# Patient Record
Sex: MALE | Race: WHITE | Employment: OTHER | URBAN - METROPOLITAN AREA
[De-identification: names, ages, dates, MRNs, and addresses within clinical notes are randomized per-mention and may not be internally consistent; named-entity substitution may affect disease eponyms.]

---

## 2017-01-01 ENCOUNTER — HOSPITAL ENCOUNTER (EMERGENCY)
Facility: HOSPITAL | Age: 82
Discharge: HOME | End: 2017-01-01
Attending: EMERGENCY MEDICINE
Payer: MEDICARE

## 2017-01-01 ENCOUNTER — APPOINTMENT (OUTPATIENT)
Dept: ULTRASOUND IMAGING | Facility: HOSPITAL | Age: 82
DRG: 062 | End: 2017-01-01
Attending: Other
Payer: MEDICARE

## 2017-01-01 ENCOUNTER — APPOINTMENT (OUTPATIENT)
Dept: CT IMAGING | Facility: HOSPITAL | Age: 82
DRG: 062 | End: 2017-01-01
Attending: Other
Payer: MEDICARE

## 2017-01-01 ENCOUNTER — APPOINTMENT (OUTPATIENT)
Dept: GENERAL RADIOLOGY | Facility: HOSPITAL | Age: 82
DRG: 062 | End: 2017-01-01
Attending: INTERNAL MEDICINE
Payer: MEDICARE

## 2017-01-01 ENCOUNTER — APPOINTMENT (OUTPATIENT)
Dept: GENERAL RADIOLOGY | Facility: HOSPITAL | Age: 82
DRG: 280 | End: 2017-01-01
Attending: EMERGENCY MEDICINE
Payer: MEDICARE

## 2017-01-01 ENCOUNTER — APPOINTMENT (OUTPATIENT)
Dept: CT IMAGING | Facility: HOSPITAL | Age: 82
DRG: 062 | End: 2017-01-01
Payer: MEDICARE

## 2017-01-01 ENCOUNTER — HOSPITAL ENCOUNTER (INPATIENT)
Facility: HOSPITAL | Age: 82
LOS: 4 days | Discharge: NURSING, REHAB OR LONG-TERM CARE | DRG: 062 | End: 2017-01-01
Attending: EMERGENCY MEDICINE | Admitting: INTERNAL MEDICINE
Payer: MEDICARE

## 2017-01-01 ENCOUNTER — APPOINTMENT (OUTPATIENT)
Dept: ULTRASOUND IMAGING | Facility: HOSPITAL | Age: 82
DRG: 280 | End: 2017-01-01
Attending: HOSPITALIST
Payer: MEDICARE

## 2017-01-01 ENCOUNTER — APPOINTMENT (OUTPATIENT)
Dept: CV DIAGNOSTICS | Facility: HOSPITAL | Age: 82
DRG: 280 | End: 2017-01-01
Attending: HOSPITALIST
Payer: MEDICARE

## 2017-01-01 ENCOUNTER — APPOINTMENT (OUTPATIENT)
Dept: CV DIAGNOSTICS | Facility: HOSPITAL | Age: 82
DRG: 062 | End: 2017-01-01
Attending: INTERNAL MEDICINE
Payer: MEDICARE

## 2017-01-01 ENCOUNTER — HOSPITAL ENCOUNTER (INPATIENT)
Facility: HOSPITAL | Age: 82
LOS: 9 days | Discharge: HOSPICE | DRG: 280 | End: 2018-01-01
Attending: EMERGENCY MEDICINE | Admitting: HOSPITALIST
Payer: MEDICARE

## 2017-01-01 ENCOUNTER — APPOINTMENT (OUTPATIENT)
Dept: CT IMAGING | Facility: HOSPITAL | Age: 82
DRG: 062 | End: 2017-01-01
Attending: EMERGENCY MEDICINE
Payer: MEDICARE

## 2017-01-01 VITALS
TEMPERATURE: 98 F | HEART RATE: 66 BPM | BODY MASS INDEX: 21.37 KG/M2 | WEIGHT: 141 LBS | DIASTOLIC BLOOD PRESSURE: 63 MMHG | OXYGEN SATURATION: 96 % | HEIGHT: 68 IN | RESPIRATION RATE: 18 BRPM | SYSTOLIC BLOOD PRESSURE: 124 MMHG

## 2017-01-01 VITALS
RESPIRATION RATE: 20 BRPM | TEMPERATURE: 98 F | WEIGHT: 145 LBS | BODY MASS INDEX: 22.76 KG/M2 | SYSTOLIC BLOOD PRESSURE: 156 MMHG | DIASTOLIC BLOOD PRESSURE: 86 MMHG | OXYGEN SATURATION: 95 % | HEART RATE: 80 BPM | HEIGHT: 67 IN

## 2017-01-01 VITALS
SYSTOLIC BLOOD PRESSURE: 142 MMHG | DIASTOLIC BLOOD PRESSURE: 83 MMHG | OXYGEN SATURATION: 95 % | RESPIRATION RATE: 18 BRPM | HEIGHT: 68 IN | TEMPERATURE: 98 F | WEIGHT: 128 LBS | BODY MASS INDEX: 19.4 KG/M2 | HEART RATE: 85 BPM

## 2017-01-01 DIAGNOSIS — B02.33 HERPES ZOSTER KERATITIS: Primary | ICD-10-CM

## 2017-01-01 DIAGNOSIS — H61.21 IMPACTED CERUMEN OF RIGHT EAR: ICD-10-CM

## 2017-01-01 DIAGNOSIS — R09.02 HYPOXIA: Primary | ICD-10-CM

## 2017-01-01 DIAGNOSIS — I50.9 CONGESTIVE HEART FAILURE, UNSPECIFIED CONGESTIVE HEART FAILURE CHRONICITY, UNSPECIFIED CONGESTIVE HEART FAILURE TYPE: ICD-10-CM

## 2017-01-01 DIAGNOSIS — J18.9 COMMUNITY ACQUIRED PNEUMONIA, UNSPECIFIED LATERALITY: ICD-10-CM

## 2017-01-01 DIAGNOSIS — B02.9 HERPES ZOSTER WITHOUT COMPLICATION: Primary | ICD-10-CM

## 2017-01-01 DIAGNOSIS — I63.9 ACUTE CVA (CEREBROVASCULAR ACCIDENT) (HCC): Primary | ICD-10-CM

## 2017-01-01 LAB
ADENOVIRUS PCR:: NEGATIVE
ANION GAP SERPL CALC-SCNC: 11 MMOL/L (ref 0–18)
ANION GAP SERPL CALC-SCNC: 12 MMOL/L (ref 0–18)
ANION GAP SERPL CALC-SCNC: 17 MMOL/L (ref 0–18)
APTT PPP: 32 SECONDS (ref 23.2–35.3)
APTT PPP: 43.6 SECONDS (ref 23.2–35.3)
APTT PPP: 44.2 SECONDS (ref 23.2–35.3)
B PERT DNA SPEC QL NAA+PROBE: NEGATIVE
BASOPHILS # BLD: 0 K/UL (ref 0–0.2)
BASOPHILS # BLD: 0.1 K/UL (ref 0–0.2)
BASOPHILS NFR BLD: 0 %
BASOPHILS NFR BLD: 0 %
BNP SERPL-MCNC: 342 PG/ML (ref 0–100)
BUN SERPL-MCNC: 21 MG/DL (ref 8–20)
BUN SERPL-MCNC: 23 MG/DL (ref 8–20)
BUN SERPL-MCNC: 24 MG/DL (ref 8–20)
BUN/CREAT SERPL: 19.1 (ref 10–20)
BUN/CREAT SERPL: 21.7 (ref 10–20)
BUN/CREAT SERPL: 23.5 (ref 10–20)
C PNEUM DNA SPEC QL NAA+PROBE: NEGATIVE
CALCIUM SERPL-MCNC: 9.3 MG/DL (ref 8.5–10.5)
CALCIUM SERPL-MCNC: 9.5 MG/DL (ref 8.5–10.5)
CALCIUM SERPL-MCNC: 9.6 MG/DL (ref 8.5–10.5)
CHLORIDE SERPL-SCNC: 97 MMOL/L (ref 95–110)
CHLORIDE SERPL-SCNC: 99 MMOL/L (ref 95–110)
CHLORIDE SERPL-SCNC: 99 MMOL/L (ref 95–110)
CHOLEST SERPL-MCNC: 106 MG/DL (ref 110–200)
CO2 SERPL-SCNC: 27 MMOL/L (ref 22–32)
CO2 SERPL-SCNC: 27 MMOL/L (ref 22–32)
CO2 SERPL-SCNC: 28 MMOL/L (ref 22–32)
CORONAVIRUS 229E PCR:: NEGATIVE
CORONAVIRUS HKU1 PCR:: NEGATIVE
CORONAVIRUS NL63 PCR:: NEGATIVE
CORONAVIRUS OC43 PCR:: NEGATIVE
CREAT SERPL-MCNC: 1.02 MG/DL (ref 0.5–1.5)
CREAT SERPL-MCNC: 1.06 MG/DL (ref 0.5–1.5)
CREAT SERPL-MCNC: 1.1 MG/DL (ref 0.5–1.5)
EOSINOPHIL # BLD: 0 K/UL (ref 0–0.7)
EOSINOPHIL # BLD: 0.1 K/UL (ref 0–0.7)
EOSINOPHIL NFR BLD: 0 %
EOSINOPHIL NFR BLD: 1 %
ERYTHROCYTE [DISTWIDTH] IN BLOOD BY AUTOMATED COUNT: 14.2 % (ref 11–15)
ERYTHROCYTE [DISTWIDTH] IN BLOOD BY AUTOMATED COUNT: 14.3 % (ref 11–15)
ERYTHROCYTE [DISTWIDTH] IN BLOOD BY AUTOMATED COUNT: 14.3 % (ref 11–15)
FLUAV RNA SPEC QL NAA+PROBE: NEGATIVE
FLUBV RNA SPEC QL NAA+PROBE: NEGATIVE
GLUCOSE SERPL-MCNC: 120 MG/DL (ref 70–99)
GLUCOSE SERPL-MCNC: 135 MG/DL (ref 70–99)
GLUCOSE SERPL-MCNC: 179 MG/DL (ref 70–99)
HCT VFR BLD AUTO: 43.7 % (ref 41–52)
HCT VFR BLD AUTO: 44.7 % (ref 41–52)
HCT VFR BLD AUTO: 45.1 % (ref 41–52)
HDLC SERPL-MCNC: 46 MG/DL
HGB BLD-MCNC: 14.2 G/DL (ref 13.5–17.5)
HGB BLD-MCNC: 14.3 G/DL (ref 13.5–17.5)
HGB BLD-MCNC: 14.6 G/DL (ref 13.5–17.5)
LDLC SERPL CALC-MCNC: 50 MG/DL (ref 0–99)
LYMPHOCYTES # BLD: 1.1 K/UL (ref 1–4)
LYMPHOCYTES # BLD: 1.3 K/UL (ref 1–4)
LYMPHOCYTES NFR BLD: 5 %
LYMPHOCYTES NFR BLD: 7 %
MCH RBC QN AUTO: 30.4 PG (ref 27–32)
MCH RBC QN AUTO: 30.6 PG (ref 27–32)
MCH RBC QN AUTO: 30.7 PG (ref 27–32)
MCHC RBC AUTO-ENTMCNC: 32 G/DL (ref 32–37)
MCHC RBC AUTO-ENTMCNC: 32.3 G/DL (ref 32–37)
MCHC RBC AUTO-ENTMCNC: 32.5 G/DL (ref 32–37)
MCV RBC AUTO: 94.6 FL (ref 80–100)
MCV RBC AUTO: 94.7 FL (ref 80–100)
MCV RBC AUTO: 95 FL (ref 80–100)
METAPNEUMOVIRUS PCR:: NEGATIVE
MONOCYTES # BLD: 1.7 K/UL (ref 0–1)
MONOCYTES # BLD: 2.3 K/UL (ref 0–1)
MONOCYTES NFR BLD: 11 %
MONOCYTES NFR BLD: 9 %
MRSA DNA SPEC QL NAA+PROBE: NEGATIVE
MYCOPLASMA PNEUMONIA PCR:: NEGATIVE
NEUTROPHILS # BLD AUTO: 12.5 K/UL (ref 1.8–7.7)
NEUTROPHILS # BLD AUTO: 22 K/UL (ref 1.8–7.7)
NEUTROPHILS NFR BLD: 81 %
NEUTROPHILS NFR BLD: 81 %
NEUTS BAND NFR BLD: 5 %
NONHDLC SERPL-MCNC: 60 MG/DL
OSMOLALITY UR CALC.SUM OF ELEC: 291 MOSM/KG (ref 275–295)
OSMOLALITY UR CALC.SUM OF ELEC: 293 MOSM/KG (ref 275–295)
OSMOLALITY UR CALC.SUM OF ELEC: 298 MOSM/KG (ref 275–295)
PARAINFLUENZA 1 PCR:: NEGATIVE
PARAINFLUENZA 2 PCR:: NEGATIVE
PARAINFLUENZA 3 PCR:: NEGATIVE
PARAINFLUENZA 4 PCR:: NEGATIVE
PLATELET # BLD AUTO: 407 K/UL (ref 140–400)
PLATELET # BLD AUTO: 428 K/UL (ref 140–400)
PLATELET # BLD AUTO: 430 K/UL (ref 140–400)
PMV BLD AUTO: 8.1 FL (ref 7.4–10.3)
PMV BLD AUTO: 8.3 FL (ref 7.4–10.3)
PMV BLD AUTO: 8.3 FL (ref 7.4–10.3)
POTASSIUM SERPL-SCNC: 4.1 MMOL/L (ref 3.3–5.1)
POTASSIUM SERPL-SCNC: 4.2 MMOL/L (ref 3.3–5.1)
POTASSIUM SERPL-SCNC: 4.4 MMOL/L (ref 3.3–5.1)
PROCALCITONIN SERPL-MCNC: 1.33 NG/ML (ref ?–0.11)
RBC # BLD AUTO: 4.62 M/UL (ref 4.5–5.9)
RBC # BLD AUTO: 4.71 M/UL (ref 4.5–5.9)
RBC # BLD AUTO: 4.77 M/UL (ref 4.5–5.9)
RHINOVIRUS/ENTERO PCR:: NEGATIVE
RSV RNA SPEC QL NAA+PROBE: NEGATIVE
SODIUM SERPL-SCNC: 137 MMOL/L (ref 136–144)
SODIUM SERPL-SCNC: 139 MMOL/L (ref 136–144)
SODIUM SERPL-SCNC: 141 MMOL/L (ref 136–144)
T4 FREE SERPL-MCNC: 0.89 NG/DL (ref 0.58–1.64)
TRIGL SERPL-MCNC: 52 MG/DL (ref 1–149)
TROPONIN I SERPL-MCNC: 0.1 NG/ML (ref ?–0.03)
TROPONIN I SERPL-MCNC: 0.27 NG/ML (ref ?–0.03)
TROPONIN I SERPL-MCNC: 12.02 NG/ML (ref ?–0.03)
TROPONIN I SERPL-MCNC: 13.27 NG/ML (ref ?–0.03)
TROPONIN I SERPL-MCNC: 8.45 NG/ML (ref ?–0.03)
TSH SERPL-ACNC: 7.74 UIU/ML (ref 0.45–5.33)
WBC # BLD AUTO: 15.4 K/UL (ref 4–11)
WBC # BLD AUTO: 23.7 K/UL (ref 4–11)
WBC # BLD AUTO: 25.5 K/UL (ref 4–11)

## 2017-01-01 PROCEDURE — 93005 ELECTROCARDIOGRAM TRACING: CPT

## 2017-01-01 PROCEDURE — 93306 TTE W/DOPPLER COMPLETE: CPT | Performed by: INTERNAL MEDICINE

## 2017-01-01 PROCEDURE — 87633 RESP VIRUS 12-25 TARGETS: CPT | Performed by: EMERGENCY MEDICINE

## 2017-01-01 PROCEDURE — 93010 ELECTROCARDIOGRAM REPORT: CPT | Performed by: HOSPITALIST

## 2017-01-01 PROCEDURE — 93306 TTE W/DOPPLER COMPLETE: CPT | Performed by: HOSPITALIST

## 2017-01-01 PROCEDURE — 94660 CPAP INITIATION&MGMT: CPT

## 2017-01-01 PROCEDURE — 84484 ASSAY OF TROPONIN QUANT: CPT | Performed by: HOSPITALIST

## 2017-01-01 PROCEDURE — 87040 BLOOD CULTURE FOR BACTERIA: CPT | Performed by: EMERGENCY MEDICINE

## 2017-01-01 PROCEDURE — 80061 LIPID PANEL: CPT | Performed by: EMERGENCY MEDICINE

## 2017-01-01 PROCEDURE — 99223 1ST HOSP IP/OBS HIGH 75: CPT | Performed by: OTHER

## 2017-01-01 PROCEDURE — 84443 ASSAY THYROID STIM HORMONE: CPT | Performed by: HOSPITALIST

## 2017-01-01 PROCEDURE — 84484 ASSAY OF TROPONIN QUANT: CPT | Performed by: EMERGENCY MEDICINE

## 2017-01-01 PROCEDURE — 87798 DETECT AGENT NOS DNA AMP: CPT | Performed by: EMERGENCY MEDICINE

## 2017-01-01 PROCEDURE — 93880 EXTRACRANIAL BILAT STUDY: CPT | Performed by: OTHER

## 2017-01-01 PROCEDURE — 70450 CT HEAD/BRAIN W/O DYE: CPT

## 2017-01-01 PROCEDURE — 93010 ELECTROCARDIOGRAM REPORT: CPT | Performed by: EMERGENCY MEDICINE

## 2017-01-01 PROCEDURE — 94667 MNPJ CHEST WALL 1ST: CPT

## 2017-01-01 PROCEDURE — 80048 BASIC METABOLIC PNL TOTAL CA: CPT | Performed by: HOSPITALIST

## 2017-01-01 PROCEDURE — 80048 BASIC METABOLIC PNL TOTAL CA: CPT | Performed by: EMERGENCY MEDICINE

## 2017-01-01 PROCEDURE — 87529 HSV DNA AMP PROBE: CPT | Performed by: INTERNAL MEDICINE

## 2017-01-01 PROCEDURE — 3E03317 INTRODUCTION OF OTHER THROMBOLYTIC INTO PERIPHERAL VEIN, PERCUTANEOUS APPROACH: ICD-10-PCS | Performed by: INTERNAL MEDICINE

## 2017-01-01 PROCEDURE — 99285 EMERGENCY DEPT VISIT HI MDM: CPT

## 2017-01-01 PROCEDURE — 87641 MR-STAPH DNA AMP PROBE: CPT | Performed by: EMERGENCY MEDICINE

## 2017-01-01 PROCEDURE — 99233 SBSQ HOSP IP/OBS HIGH 50: CPT | Performed by: OTHER

## 2017-01-01 PROCEDURE — 99283 EMERGENCY DEPT VISIT LOW MDM: CPT

## 2017-01-01 PROCEDURE — 84145 PROCALCITONIN (PCT): CPT | Performed by: EMERGENCY MEDICINE

## 2017-01-01 PROCEDURE — 83880 ASSAY OF NATRIURETIC PEPTIDE: CPT | Performed by: EMERGENCY MEDICINE

## 2017-01-01 PROCEDURE — 93970 EXTREMITY STUDY: CPT | Performed by: HOSPITALIST

## 2017-01-01 PROCEDURE — 87581 M.PNEUMON DNA AMP PROBE: CPT | Performed by: EMERGENCY MEDICINE

## 2017-01-01 PROCEDURE — 96365 THER/PROPH/DIAG IV INF INIT: CPT

## 2017-01-01 PROCEDURE — 85025 COMPLETE CBC W/AUTO DIFF WBC: CPT | Performed by: HOSPITALIST

## 2017-01-01 PROCEDURE — 70450 CT HEAD/BRAIN W/O DYE: CPT | Performed by: OTHER

## 2017-01-01 PROCEDURE — 94668 MNPJ CHEST WALL SBSQ: CPT

## 2017-01-01 PROCEDURE — 96375 TX/PRO/DX INJ NEW DRUG ADDON: CPT

## 2017-01-01 PROCEDURE — 85027 COMPLETE CBC AUTOMATED: CPT | Performed by: HOSPITALIST

## 2017-01-01 PROCEDURE — 70498 CT ANGIOGRAPHY NECK: CPT | Performed by: EMERGENCY MEDICINE

## 2017-01-01 PROCEDURE — 74230 X-RAY XM SWLNG FUNCJ C+: CPT | Performed by: INTERNAL MEDICINE

## 2017-01-01 PROCEDURE — 71010 XR CHEST AP PORTABLE  (CPT=71010): CPT | Performed by: EMERGENCY MEDICINE

## 2017-01-01 PROCEDURE — 84439 ASSAY OF FREE THYROXINE: CPT | Performed by: HOSPITALIST

## 2017-01-01 PROCEDURE — 36415 COLL VENOUS BLD VENIPUNCTURE: CPT | Performed by: INTERNAL MEDICINE

## 2017-01-01 PROCEDURE — 85025 COMPLETE CBC W/AUTO DIFF WBC: CPT | Performed by: EMERGENCY MEDICINE

## 2017-01-01 PROCEDURE — 87486 CHLMYD PNEUM DNA AMP PROBE: CPT | Performed by: EMERGENCY MEDICINE

## 2017-01-01 PROCEDURE — 96367 TX/PROPH/DG ADDL SEQ IV INF: CPT

## 2017-01-01 PROCEDURE — 85730 THROMBOPLASTIN TIME PARTIAL: CPT | Performed by: HOSPITALIST

## 2017-01-01 PROCEDURE — 85007 BL SMEAR W/DIFF WBC COUNT: CPT | Performed by: HOSPITALIST

## 2017-01-01 PROCEDURE — 70496 CT ANGIOGRAPHY HEAD: CPT | Performed by: EMERGENCY MEDICINE

## 2017-01-01 RX ORDER — AMIODARONE HYDROCHLORIDE 200 MG/1
400 TABLET ORAL 3 TIMES DAILY
Status: DISCONTINUED | OUTPATIENT
Start: 2017-01-01 | End: 2017-01-01

## 2017-01-01 RX ORDER — ACETAMINOPHEN 325 MG/1
650 TABLET ORAL EVERY 4 HOURS PRN
Status: DISCONTINUED | OUTPATIENT
Start: 2017-01-01 | End: 2017-01-01

## 2017-01-01 RX ORDER — SODIUM CHLORIDE 9 MG/ML
INJECTION, SOLUTION INTRAVENOUS CONTINUOUS
Status: DISCONTINUED | OUTPATIENT
Start: 2017-01-01 | End: 2017-01-01

## 2017-01-01 RX ORDER — LEVOTHYROXINE SODIUM 0.05 MG/1
50 TABLET ORAL
Status: DISCONTINUED | OUTPATIENT
Start: 2017-01-01 | End: 2018-01-01

## 2017-01-01 RX ORDER — POTASSIUM CHLORIDE 14.9 MG/ML
20 INJECTION INTRAVENOUS ONCE
Status: COMPLETED | OUTPATIENT
Start: 2017-01-01 | End: 2017-01-01

## 2017-01-01 RX ORDER — ACETAMINOPHEN 500 MG
1000 TABLET ORAL ONCE
Status: COMPLETED | OUTPATIENT
Start: 2017-01-01 | End: 2017-01-01

## 2017-01-01 RX ORDER — ACETAMINOPHEN 325 MG/1
650 TABLET ORAL EVERY 6 HOURS PRN
Status: DISCONTINUED | OUTPATIENT
Start: 2017-01-01 | End: 2018-01-01

## 2017-01-01 RX ORDER — WARFARIN SODIUM 1 MG/1
1 TABLET ORAL NIGHTLY
Status: ON HOLD | COMMUNITY
End: 2017-01-01

## 2017-01-01 RX ORDER — ATORVASTATIN CALCIUM 40 MG/1
40 TABLET, FILM COATED ORAL DAILY
Status: DISCONTINUED | OUTPATIENT
Start: 2017-01-01 | End: 2017-01-01

## 2017-01-01 RX ORDER — METOPROLOL SUCCINATE 25 MG/1
12.5 TABLET, EXTENDED RELEASE ORAL
Status: DISCONTINUED | OUTPATIENT
Start: 2017-01-01 | End: 2018-01-01

## 2017-01-01 RX ORDER — AZITHROMYCIN 250 MG/1
500 TABLET, FILM COATED ORAL
Status: COMPLETED | OUTPATIENT
Start: 2017-01-01 | End: 2018-01-01

## 2017-01-01 RX ORDER — ACETAMINOPHEN 325 MG/1
650 TABLET ORAL EVERY 4 HOURS PRN
Qty: 100 TABLET | Refills: 0 | Status: SHIPPED | OUTPATIENT
Start: 2017-01-01 | End: 2018-01-01

## 2017-01-01 RX ORDER — ASPIRIN 81 MG/1
324 TABLET, CHEWABLE ORAL ONCE
Status: COMPLETED | OUTPATIENT
Start: 2017-01-01 | End: 2017-01-01

## 2017-01-01 RX ORDER — LABETALOL HYDROCHLORIDE 5 MG/ML
10 INJECTION, SOLUTION INTRAVENOUS ONCE
Status: DISCONTINUED | OUTPATIENT
Start: 2017-01-01 | End: 2017-01-01

## 2017-01-01 RX ORDER — FUROSEMIDE 10 MG/ML
20 INJECTION INTRAMUSCULAR; INTRAVENOUS ONCE
Status: COMPLETED | OUTPATIENT
Start: 2017-01-01 | End: 2017-01-01

## 2017-01-01 RX ORDER — ACETAMINOPHEN 650 MG/1
650 SUPPOSITORY RECTAL EVERY 4 HOURS PRN
Status: DISCONTINUED | OUTPATIENT
Start: 2017-01-01 | End: 2017-01-01

## 2017-01-01 RX ORDER — HYDROCODONE BITARTRATE AND ACETAMINOPHEN 5; 325 MG/1; MG/1
TABLET ORAL EVERY 8 HOURS PRN
Qty: 15 TABLET | Refills: 0 | Status: SHIPPED | OUTPATIENT
Start: 2017-01-01 | End: 2017-01-01

## 2017-01-01 RX ORDER — HEPARIN SODIUM 1000 [USP'U]/ML
60 INJECTION, SOLUTION INTRAVENOUS; SUBCUTANEOUS ONCE
Status: COMPLETED | OUTPATIENT
Start: 2017-01-01 | End: 2017-01-01

## 2017-01-01 RX ORDER — SODIUM CHLORIDE 0.9 % (FLUSH) 0.9 %
3 SYRINGE (ML) INJECTION AS NEEDED
Status: DISCONTINUED | OUTPATIENT
Start: 2017-01-01 | End: 2018-01-01

## 2017-01-01 RX ORDER — ATORVASTATIN CALCIUM 40 MG/1
80 TABLET, FILM COATED ORAL DAILY
Status: DISCONTINUED | OUTPATIENT
Start: 2017-01-01 | End: 2017-01-01

## 2017-01-01 RX ORDER — AMIODARONE HYDROCHLORIDE 400 MG/1
300 TABLET ORAL 2 TIMES DAILY
Qty: 90 TABLET | Refills: 0 | Status: SHIPPED | OUTPATIENT
Start: 2017-01-01 | End: 2017-01-01

## 2017-01-01 RX ORDER — GABAPENTIN 300 MG/1
300 CAPSULE ORAL 3 TIMES DAILY
Qty: 30 CAPSULE | Refills: 12 | Status: SHIPPED | OUTPATIENT
Start: 2017-01-01 | End: 2017-01-01

## 2017-01-01 RX ORDER — ASPIRIN 81 MG/1
81 TABLET ORAL DAILY
Status: DISCONTINUED | OUTPATIENT
Start: 2017-01-01 | End: 2018-01-01

## 2017-01-01 RX ORDER — ATORVASTATIN CALCIUM 10 MG/1
10 TABLET, FILM COATED ORAL NIGHTLY
Status: DISCONTINUED | OUTPATIENT
Start: 2017-01-01 | End: 2018-01-01

## 2017-01-01 RX ORDER — POLYVINYL ALCOHOL 14 MG/ML
2 SOLUTION/ DROPS OPHTHALMIC DAILY
Status: DISCONTINUED | OUTPATIENT
Start: 2017-01-01 | End: 2017-01-01

## 2017-01-01 RX ORDER — HEPARIN SODIUM 5000 [USP'U]/ML
5000 INJECTION, SOLUTION INTRAVENOUS; SUBCUTANEOUS EVERY 12 HOURS SCHEDULED
Status: DISCONTINUED | OUTPATIENT
Start: 2017-01-01 | End: 2017-01-01 | Stop reason: ALTCHOICE

## 2017-01-01 RX ORDER — HEPARIN SODIUM AND DEXTROSE 10000; 5 [USP'U]/100ML; G/100ML
12 INJECTION INTRAVENOUS ONCE
Status: COMPLETED | OUTPATIENT
Start: 2017-01-01 | End: 2017-01-01

## 2017-01-01 RX ORDER — HEPARIN SODIUM AND DEXTROSE 10000; 5 [USP'U]/100ML; G/100ML
INJECTION INTRAVENOUS CONTINUOUS
Status: DISCONTINUED | OUTPATIENT
Start: 2017-01-01 | End: 2018-01-01

## 2017-01-01 RX ORDER — ONDANSETRON 2 MG/ML
4 INJECTION INTRAMUSCULAR; INTRAVENOUS EVERY 6 HOURS PRN
Status: DISCONTINUED | OUTPATIENT
Start: 2017-01-01 | End: 2018-01-01

## 2017-01-01 RX ORDER — IBUPROFEN 400 MG/1
400 TABLET ORAL ONCE
Status: COMPLETED | OUTPATIENT
Start: 2017-01-01 | End: 2017-01-01

## 2017-01-01 RX ORDER — GABAPENTIN 100 MG/1
100 CAPSULE ORAL 3 TIMES DAILY
Status: DISCONTINUED | OUTPATIENT
Start: 2017-01-01 | End: 2017-01-01

## 2017-03-16 PROBLEM — I73.9 PAD (PERIPHERAL ARTERY DISEASE) (HCC): Status: ACTIVE | Noted: 2017-01-01

## 2017-04-23 NOTE — ED PROVIDER NOTES
Patient Seen in: [facility] AND [facility] Emergency Department    History   Patient presents with:  Rash Skin Problem (integumentary)    Stated Complaint: shingles    HPI    Patient presents with shingles.   He was diagnosed by his doctor put on acyclovir he was Tab,  TAKE ONE TABLET DAILY (STOP TAKING LOVASTATIN)   METOPROLOL SUCCINATE 25 MG OR TB24,  1/2 tablet once a day in the AM.    ASPIR-81 81 MG OR TBEC,  1 TABLET TWICE DAILY       No family history on file.       Smoking Status: Never Smoker dry, well perfused. Good skin turgor. No rashes seen. Neurology:  Moving all extremities equally with good coordination. No cranial nerve asymmetry noted. Psychiatric:  Normal affect. not Oriented. No unusual behavior. Interacting well.     We will

## 2017-04-23 NOTE — ED INITIAL ASSESSMENT (HPI)
Poss shingles rash to right eye that began thurs he did have an eye exam thurs .  Denies cough or fever

## 2017-04-25 PROBLEM — B02.30 HERPES ZOSTER OPHTHALMICUS OF RIGHT EYE: Status: ACTIVE | Noted: 2017-01-01

## 2017-04-25 PROBLEM — B02.9: Status: ACTIVE | Noted: 2017-01-01

## 2017-05-05 NOTE — ED PROVIDER NOTES
Patient Seen in: [facility] AND [facility] Emergency Department    History   Patient presents with:  Shingles      HPI    Patient presents complaining of ongoing shingles symptoms.   States he was diagnosed around April 20 and was started on acyclovir at that kameron Musculoskeletal: Negative for back pain and neck pain. Skin: Positive for rash. Negative for wound. Neurological: Negative for syncope and headaches. Constitutional and vital signs reviewed.       All other systems reviewed and negative except a yesterday. Reassured patient that his diagnosis is in fact shingles and that he is on appropriate therapy at this time. He understands to take home pain medication as needed.   Discussed the case with Dr. Tabitha Archuleta, on-call for the patient's PMD.  He will

## 2017-05-05 NOTE — ED NOTES
Pt noted with lesion beneath right nostril, c/o burning sensation. Denies fevers, drainage or trouble breathing.

## 2017-06-09 PROBLEM — I63.9 ACUTE CVA (CEREBROVASCULAR ACCIDENT) (HCC): Status: ACTIVE | Noted: 2017-01-01

## 2017-06-09 NOTE — ED NOTES
Trop drawn in the field by medics was a short draw lab called for a redraw trop.  Redraw rainbow sent down as well

## 2017-06-09 NOTE — HISTORICAL OFFICE NOTE
Sarah Felton  : 1926  ACCOUNT:  [id number]  [phone]  PCP: Dr. Carter Brain    TODAY'S DATE: 10/21/2015  DICTATED BY:  Mine Low M.D.]    HPI:  [On 10/21/2015, Laxmi Craig, an 51-year-old male, presented with no interim cardiac compla rhythm, clear to auscultation. GI: no masses, tenderness or hepatosplenomegaly, rectal deferred. MS: adequate gait for exercise/testing. EXT: no clubbing or cyanosis. SKIN: no rashes, lesions, ulcers.   NEURO/PSYCH: alert and oriented to time, place and pe

## 2017-06-09 NOTE — H&P
Patient has no new complaints. He denies headache. His speech remains dysarthric, but he is more fluent. He has a mild right facial droop. No drift of his upper extremities. He has received TPA. CT angiogram did not show any large vessel occlusion.

## 2017-06-09 NOTE — ED PROVIDER NOTES
Patient Seen in: [facility] AND [facility] Emergency Department    History   No chief complaint on file.     Stated Complaint: Maida Zhou    HPI    80year old male with a history of stroke in the past on aspirin Plavix, possibly on Coumadin complains that at janie [facility] elements reviewed from today and agreed except as otherwise stated in HPI.     Physical Exam     ED Triage Vitals   BP 06/09/17 0953 158/86 mmHg   Pulse 06/09/17 0953 90   Resp 06/09/17 0953 18   Temp --    Temp src --    SpO2 06/09/17 0953 95 %   O2 D Nursing note and vitals reviewed.               ED Course   Nursing notes and Triage vitals reviewed  Labs Reviewed   CBC W/ DIFFERENTIAL - Abnormal; Notable for the following:     RBC 4.45 (*)     MCH 32.7 (*)     Monocyte Absolute 1.3 (*)     All other co commensurate in caliber, consistent with parenchymal volume loss of a     degree that is appropriate for age. No hydrocephalus, subarachnoid     hemorrhage, or effacement of the basal     cisterns is appreciated. There is no extra-axial fluid collection. evidence of acute intracranial hemorrhage. 2. Senescent changes of parenchymal volume loss with sequela of chronic     microvascular ischemic disease.           3. There is large vessel atherosclerosis involving the anterior and     posterior circ Medical Record Review: I personally reviewed available prior medical records for any recent pertinent discharge summaries, testing, and procedures and reviewed those reports. Complicating Factors:  The patient already has has Personal history of prostat Critical Care Time: Reason: There is a high probability of imminent life or organ threatening deterioration that required my full attention.  I spent a total of 60 minutes minutes of critical care time in obtaining history, performing a physical exam, bedsi

## 2017-06-09 NOTE — ED NOTES
Unclear true onset of stroke symptoms. Pt verbalizes R eye vision/pain last night.  Wife can not confirm if patient at baseline this am. Dr Patti Tolbert at bedside determine pt is not a TPA candidate

## 2017-06-09 NOTE — CONSULTS
[facility]OS[facility] - [place] [facility]    Report of Consultation    Raissa Elsie Patient Status:  Emergency    1926 MRN [id number]   Location [address] Attending Sona Au MD   Hosp Day # 0 PCP Joe Boswell MD Diarrhea  Amoxicillin-Na Florentin*    Hives  Erythromycin Base       Nausea only  Opioid Analgesics       Nausea only  Tramadol                Confusion  Alc-Benzyl Alc-Sulf*    Nausea only  Rofecoxib               Nausea only  Levaquin                Nausea o intact. Sensory: Intact to Vibration, temperature, fine touch, proprioception and pin prick in the UE and LE. Stereognosis intact  DTR: 2+ in the upper and lower extremities, with absent ankles.   No Babinski, no hoffmans, no clonus  Coordination: Finger t with his risks being higher on aspirin and Coumadin, but I explained to him that there is no contraindication to him receiving the drug. After reviewing the risks and benefits, he does consent to TPA. Protocol will be followed.   I discussed case with

## 2017-06-09 NOTE — CONSULTS
[facility]    PATIENT'S NAME: Rcbraxton Jose Luis   ATTENDING PHYSICIAN: Jem Angeles MD   CONSULTING PHYSICIAN: Mona Khan MD   PATIENT ACCOUNT#:   [id number]    LOCATION:  [address] [place] [address] #:   [id number]       DATE OF BIRTH:  1 IMPRESSION:    1. Acute cerebrovascular accident, now treated with tPA. 2.   Coronary artery disease, clinically stable. RECOMMENDATION:  Stroke treatment by Neurology, echo and neurologic imaging.   He did have a carotid ultrasound in 2015 mario

## 2017-06-09 NOTE — PLAN OF CARE
Problem: PAIN - ADULT  Goal: Verbalizes/displays adequate comfort level or patient’s stated pain goal  INTERVENTIONS:  - Encourage pt to monitor pain and request assistance  - Assess pain using appropriate pain scale  - Administer analgesics based on type post-discharge preferences of patient/family/discharge partner  - Complete POLST form as appropriate  - Assess patient’s ability to be responsible for managing their own health  - Refer to Case Management Department for coordinating discharge planning if t Administer anti-seizure medications as ordered  - Monitor neurological status  Outcome: Progressing  Goal: Remains free of injury related to seizure activity  INTERVENTIONS:  - Maintain airway, patient safety and administer oxygen as ordered  - Monitor pat indicated  Outcome: Completed Date Met:  06/09/17  No wounds, incisions, or drains noted.    Goal: Oral mucous membranes remain intact  INTERVENTIONS  - Assess oral mucosa and hygiene practices  - Implement preventative oral hygiene regimen  - Implement ora

## 2017-06-09 NOTE — PROGRESS NOTES
Rec'd pt from ED via cart at 1200nn. Pt is alert, oriented x2-3, TERESA. R facial droop, R tongue deviation, and slurred speech noted but improved per ED RN, Agus Piper s/p tPA administration in ED. Assessment as charted.  Dr. Benji Kat notified of admission, orde

## 2017-06-09 NOTE — ED NOTES
RN monitored transport to CCU. On arrival neuro assessment completed with receiving RN. Facial droop not as prominent. Slurred speech persists. Offers no complaints.  Hemodynamically stable

## 2017-06-09 NOTE — ED NOTES
Care assumed from CT scan presented via EMS for slurred speech and facial droop. Estimated onset at 46 when he presented to RN at care facility.  Alert and interactive with slurred speech R facial droop R tongue deviation no sensory or extremity deficits

## 2017-06-10 NOTE — PLAN OF CARE
Problem: Patient/Family Goals  Goal: Patient/Family Long Term Goal  Patient’s Long Term Goal: Return to baseline, and get back to [facility]  Interventions:  - TPA infusion  -PT/OT 24 hrs post infusion  - See additional Care Plan goals for specific interven light    Problem: DISCHARGE PLANNING  Goal: Discharge to home or other facility with appropriate resources  INTERVENTIONS:  - Identify barriers to discharge w/pt and caregiver  - Include patient/family/discharge partner in discharge planning  - Arrange for within ordered parameters to optimize cerebral perfusion and minimize risk of hemorrhage  - Monitor temperature, glucose, and sodium. Initiate appropriate interventions as ordered   Outcome: Progressing   See NIH stroke scale and neuro assessments.     Goal membranes remain intact  INTERVENTIONS  - Assess oral mucosa and hygiene practices  - Implement preventative oral hygiene regimen  - Implement oral medicated treatments as ordered   Outcome: Progressing  Oral swabs at bedside    Problem: Impaired Swallowin

## 2017-06-10 NOTE — PROGRESS NOTES
[facility]    Progress Note    Ronak Bustamante Patient Status:  Inpatient    1926 MRN [id number]   Location [facility] 2W/SW Attending Catherine Mccabe., Eduardo Baez, *   Hosp Day # 1 PCP Wanda Marcial, Len Sanderson MD       SUBJECTIVE injection 10 mg 10 mg Intravenous Once   acetaminophen (TYLENOL) 650 MG rectal suppository 650 mg 650 mg Rectal Q4H PRN   atorvastatin (LIPITOR) tab 40 mg 40 mg Oral Daily   gabapentin (NEURONTIN) cap 100 mg 100 mg Oral TID   Polyvinyl Alcohol (Moises Barbone

## 2017-06-10 NOTE — H&P
[facility]    PATIENT'S NAME: Ricci Kohler   ATTENDING PHYSICIAN: Gilberot Todd MD   PATIENT ACCOUNT#:   [id number]    LOCATION:  [facility] [address] #:   [id number]       YOB: 1926  ADMISSION DATE:       06/09/2017 active oropharyngeal lesions noted. NECK:  Thyroid feels normal.    HEART:  Normal S1, S2. No S3, S4, or murmur. ABDOMEN:  Soft, nontender. No guarding or rigidity. No mass. EXTREMITIES:  No redness, heat, tenderness, or edema.   NEUROLOGIC:  There

## 2017-06-10 NOTE — SLP NOTE
ADULT SWALLOWING EVALUATION    ASSESSMENT & PLAN   ASSESSMENT    Pt assessed sitting upright in bed. Pt presented with solid, nectar and thin liquid trials. Bilabial seal adequate with no anterior loss.  Lingual skills reduced for bolus formation, preparati (cerebrovascular accident) [facility])      Past Medical History  Past Medical History   Diagnosis Date   • CANCER      prostate   • HYPERLIPIDEMIA    • HYPERTENSION    • High blood pressure    • High cholesterol    • Visual impairment    • Cancer (HCC)    • Cor Goal #3 The patient will utilize compensatory strategies as outlined by  BSSE (clinical evaluation) including Slow rate, Small bites, Small sips, Alternate liquids/solids, No straws, Upright 90 degrees with no assistance 90 % of the time across 2 session

## 2017-06-10 NOTE — PROGRESS NOTES
[facility]    Progress Note    [facility] Emergency Department Patient Status:  Inpatient    1926 MRN [id number]   Location [facility] 2W/SW Attending Desiree Rodriguez, Mar Sanchez, *   Hosp Day # 1 PCP Chasity Lozada MD       Subjective: complaints or deficits  HEENT: denies nasal congestion, sinus pain or sore throat; hearing loss negative  RESPIRATORY: denies shortness of breath, wheezing or cough   CARDIOVASCULAR: denies chest pain or ROBERSON; no palpitations   GI: denies nausea, vomiting, microvascular ischemic disease. 3. There is large vessel atherosclerosis involving the anterior and posterior circulations. 4. Lesser incidental findings as above.    This report was called immediately at 0957 hours to emergency department pod 4 and discu

## 2017-06-10 NOTE — PROGRESS NOTES
[facility]    Progress Note    Ronak Bustamante Patient Status:  Inpatient    1926 MRN [id number]   Location [facility] 2W/SW Attending Nallely Blair., Clay Lux, *   Hosp Day # 1 PCP Josette Ornelas MD       SUBJECTIVE (TRANDATE) injection 10 mg 10 mg Intravenous Once   acetaminophen (TYLENOL) 650 MG rectal suppository 650 mg 650 mg Rectal Q4H PRN   atorvastatin (LIPITOR) tab 40 mg 40 mg Oral Daily   gabapentin (NEURONTIN) cap 100 mg 100 mg Oral TID   Polyvinyl Alcohol (

## 2017-06-11 NOTE — PROGRESS NOTES
[facility]    Progress Note    Ronak Bustamante Patient Status:  Inpatient    1926 MRN [id number]   Location [facility] 2W/SW Attending Omar Montana., Varinder Arteaga, *   Hosp Day # 2 PCP Sabrina Harden, Ghanshyam Mcbride MD       SUBJECTIVE Problem List:     Personal history of prostate cancer     CAD (coronary artery disease)     HTN (hypertension)     Elevated cholesterol     Memory loss     PAD (peripheral artery disease) (HCC)     Herpes zoster infection of maxillary region     Herpes zos

## 2017-06-11 NOTE — PLAN OF CARE
Impaired Swallowing    • Minimize aspiration risk Progressing        NEUROLOGICAL - ADULT    • Achieves stable or improved neurological status Progressing    • Achieves maximal functionality and self care Progressing        PAIN - ADULT    • Verbalizes/dis

## 2017-06-11 NOTE — DISCHARGE PLANNING
SADIQ met with the pt. At bedside. The pt. Lives with his wife at [facility] in [place] in an independent apartment. The pt. Reports being independent prior to admission with adls and ambulation with a cane in the community. The pt.  Goes to the [facility]

## 2017-06-11 NOTE — PHYSICAL THERAPY NOTE
PHYSICAL THERAPY EVALUATION - INPATIENT     Room Number: 230/230-A  Evaluation Date: 6/11/2017  Type of Evaluation: Initial    Physician Order: PT Eval and Treat    Presenting Problem: R sided weakness, dysarthria  Reason for Therapy: Mobility Dysfunct [facility])      Past Medical History  Past Medical History   Diagnosis Date   • CANCER      prostate   • HYPERLIPIDEMIA    • HYPERTENSION    • High blood pressure    • High cholesterol    • Visual impairment    • Cancer [facility])    • Coronary atherosclerosis    • Standing: Fair -    ADDITIONAL TESTS                                    NEUROLOGICAL FINDINGS  Neurological Findings: Coordination - Rapid Alternating Movement (denies n/t.  Reports full sensation UE/LE. )        Coordination - Rapid Alternating Movement: R transfers from chair, bed and toilet safe and independent     Goal #2  Current Status    Goal #3 Patient is able to ambulate 200' with cane with SBA without LOB with stable vitals.     Goal #3   Current Status    Goal #4    Goal #4   Current Status    Goal

## 2017-06-11 NOTE — PLAN OF CARE
Problem: Patient/Family Goals  Goal: Patient/Family Long Term Goal  Patient’s Long Term Goal: Return to baseline, and get back to [facility]  Interventions:  - TPA infusion  -PT/OT 24 hrs post infusion  - See additional Care Plan goals for specific interven on    Problem: DISCHARGE PLANNING  Goal: Discharge to home or other facility with appropriate resources  INTERVENTIONS:  - Identify barriers to discharge w/pt and caregiver  - Include patient/family/discharge partner in discharge planning  - Arrange for ne neurological status  - Initiate measures to prevent increased intracranial pressure  - Maintain blood pressure and fluid volume within ordered parameters to optimize cerebral perfusion and minimize risk of hemorrhage  - Monitor temperature, glucose, and so care algorithm/standards of care as needed   Outcome: Progressing  Pt turns self, incontinent care as needed  Goal: Oral mucous membranes remain intact  INTERVENTIONS  - Assess oral mucosa and hygiene practices  - Implement preventative oral hygiene regime

## 2017-06-11 NOTE — PROGRESS NOTES
[facility] AND [facility]  Cardiology Progress Note    Ronak Bustamante Patient Status:  Inpatient    1926 MRN [id number]   Location [facility] 2W/SW Attending Twin Brewer, *   Hosp Day # 2 PCP Marisol Cruz MD       Subjective: only  Opioid Analgesics       Nausea only  Tramadol                Confusion  Alc-Benzyl Alc-Sulf*    Nausea only  Rofecoxib               Nausea only  Levaquin                Nausea only    Medications:    Current Facility-Administered Medications:  0.9%

## 2017-06-11 NOTE — OCCUPATIONAL THERAPY NOTE
OCCUPATIONAL THERAPY EVALUATION - INPATIENT     Room Number: 126/230-V  Evaluation Date: 6/11/2017  Type of Evaluation: Initial       Physician Order: IP Consult to Occupational Therapy  Reason for Therapy: ADL/IADL Dysfunction and Discharge Planning    OC place & activated, call light in reach, immediate needs met, & RN aware of outcome. OT Discharge Recommendations:  Other (Comment) (subacute rehab vs home health )  OT Device Recommendations: None    PLAN  OT Treatment Plan: Energy conservation/work si x4.  Pt initially required cues for orientation of where he lived, but then recalled he lives at [place].   Memory:  appears intact  Following Commands:  follows one step commands consistently, follows multistep commands with increased time and follows mu Stand: Supervision    Toilet Transfer: CGA due to balance. Chair Transfer: CGA, pt tends to lean to left with sitting in chair or transferring to sitting position.       Bedroom Mobility: Pt with one episode loss of balance when coming out of bathroom and

## 2017-06-11 NOTE — PROGRESS NOTES
[facility]    Progress Note    Ronak Bustamante Patient Status:  Inpatient    1926 MRN [id number]   Location [facility] 2W/SW Attending Tony Burnette, Elton Díaz, *   Hosp Day # 2 PCP Claudia Al MD       Subjective: CARDIOVASCULAR: denies chest pain or ROBERSON; no palpitations   GI: denies nausea, vomiting, constipation, diarrhea; no heartburn  GENITAL/: no dysuria, urgency or frequency; no nocturia  MUSCULOSKELETAL: no joint complaints upper or lower extremities  PSY

## 2017-06-12 NOTE — PLAN OF CARE
6/11  PT was transferred from CCU room 230 at 5 pm. Pt`s daughter noted his glass is missing, pm shift RN checked the belonging do not found the glasses. RN called CCU and nurses checked for pt`s glasses and do not find it.    Glasses found in his hands per

## 2017-06-12 NOTE — PHYSICAL THERAPY NOTE
PHYSICAL THERAPY TREATMENT NOTE - INPATIENT    Room Number: 337/337-A       Presenting Problem: R sided weakness, dysarthria    Problem List  Principal Problem:    Acute CVA (cerebrovascular accident) ([facility] [address]      ASSESSMENT   Pt in bed agreeable to partici chair (including a wheelchair)?: A Little   -   Need to walk in hospital room?: A Little   -   Climbing 3-5 steps with a railing?: A Lot    AM-PAC Score:  Raw Score: 18   PT Approx Degree of Impairment Score: 46.58%   Standardized Score (AM-PAC Scale): 43.

## 2017-06-12 NOTE — DIETARY NOTE
ADULT NUTRITION INITIAL ASSESSMENT    Pt is at moderate nutrition risk. Pt does not meet malnutrition criteria.       RECOMMENDATIONS TO MD:  Recommendations to MD: RD added supplement per pt preference and allowed consistency to maximize Nutrition     NUT IBW  Usual Body Wt: 140-145 lbs       100% UBW    WEIGHT HISTORY:  Patient Weight(s) for the past 336 hrs:   Weight   06/12/17 0500 64.365 kg (141 lb 14.4 oz)   06/11/17 0530 64.365 kg (141 lb 14.4 oz)   06/09/17 0953 69 kg (152 lb 1.9 oz)       Mary Singer

## 2017-06-12 NOTE — PROGRESS NOTES
[facility]    Progress Note    Ronak Bustamante Patient Status:  Inpatient    1926 MRN [id number]   Location [facility] 3W/SW Attending Desiree Rodriguez, Mar Sanchez, *   Hosp Day # 3 PCP Lisa Torres, Kermit Larsen MD       SUBJECTIVE suppository 650 mg 650 mg Rectal Q4H PRN   gabapentin (NEURONTIN) cap 100 mg 100 mg Oral TID   Polyvinyl Alcohol (LIQUI-TEARS) 1.4 % ophthalmic solution 2 drop 2 drop Both Eyes Daily         Assessment  Patient Active Problem List:     Personal history of

## 2017-06-12 NOTE — PROGRESS NOTES
At the present time no neurological symptoms. Dr Patsy Collazo consult reviewed, appreciated. Neurological examination is unchanged with trace weakness in left hand left hip flexion. No new neurological recommendations. Will sign off.   Please call wit

## 2017-06-12 NOTE — PROGRESS NOTES
[facility]    Cardiology Progress Note  [facility]    Ronak Bustamante Patient Status:  Inpatient    1926 MRN [id number]   Location [facility] 3W/SW Attending Mary Anaya., [facility] Day # 3 P moderate/marked chronic white matter small vessel ischemic changes. 2. Chronic ischemic changes at both subinsular regions with small areas of old lacunar infarction and/or chronic ischemic changes at the basal ganglia, stable.  3. Small new focal hematoma

## 2017-06-12 NOTE — PLAN OF CARE
Problem: PAIN - ADULT  Goal: Verbalizes/displays adequate comfort level or patient’s stated pain goal  INTERVENTIONS:  - Encourage pt to monitor pain and request assistance  - Assess pain using appropriate pain scale  - Administer analgesics based on type planning if the patient needs post-hospital services based on physician/LIP order or complex needs related to functional status, cognitive ability or social support system   Outcome: Progressing

## 2017-06-12 NOTE — DISCHARGE PLANNING
6/12/17 CM Discharge planning   Pt and family requesting rehab at [facility]. Referral and medical records faxed to [facility], also requested NH DON from [employer]. Awaiting further orders to arrange rehab transfer.   ANGELITA Bacon  X 10

## 2017-06-12 NOTE — CONSULTS
PHYSICAL MEDICINE AND REHABILITATION CONSULTATION     CC: Impaired mobility and ADL dysfunction secondary to ischemic stroke    HPI: This is a 81 y/o male with a PMH of HTN, HLD, Prostate Cancer, who presented [facility] on 6/9 due to slurred spe Diagnosis Date   • CANCER      prostate   • HYPERLIPIDEMIA    • HYPERTENSION    • High blood pressure    • High cholesterol    • Visual impairment    • Cancer [facility])    • Coronary atherosclerosis    • Heart attack [facility])        Past Surgical History: atherosclerosis. .    Precautions:       Bleeding precautions Until Discontinued  Bleeding precautions Until Discontinued     Full Code    OBJECTIVE:    /85 mmHg  Pulse 87  Temp(Src) 98 °F (36.7 °C) (Oral)  Resp 20  Ht 68\"  Wt 141 lb 14.4 oz  BMI 21. family training, assess home equipment and assistive device needs. Patient's family would prefer subacute rehab at [facility] at this time. Discussed the differences in levels of care.      24 hr rehab nursing also beneficial for medication management, press

## 2017-06-12 NOTE — SLP NOTE
ADULT VIDEOFLUOROSCOPIC SWALLOWING STUDY    Admission Date: 6/9/2017  Evaluation Date: 06/12/2017  Radiologist: Kay Lott    PLAN   Diet Recommendations - Solids: Mechanical soft chopped  Diet Recommendations - Liquid:  Thin    Further Follow-up:  Follow Up straws  Effectiveness: Yes    NECTAR THICK LIQUIDS  Method of Presentation: Teaspoon  Triggered at: Valleculae  Premature Spillage to: Valleculae  Delay (seconds):  (1-2)  Residue Severity, Location: Mild  Cleared/Reduced with: Secondary swallow  Laryngeal complete OMEs targeting Lingual, Labial and Buccal strength, ROM, and coordination with 100 % accuracy within 3 session(s).    Goal #4 The patient will utilize compensatory strategies as outlined by  BSSE (clinical evaluation) including Slow rate, Small bit

## 2017-06-13 NOTE — PROGRESS NOTES
[facility]    Cardiology Progress Note  [facility]    Ronak Bustamante Patient Status:  Inpatient    1926 MRN [id number]   Location [facility] 3W/SW Attending Beaulieu Degree., [facility] Day # 4 P aspiration.   A full report will follow by the speech pathologist.                  Anoop Mcclellan MD  6/13/2017

## 2017-06-13 NOTE — DISCHARGE PLANNING
6/13CM-The Patient's RN informed this Writer that the Patient is medically stable for discharge today (6/13). This Writer informed [facility] of the above, they are  accepted the Patient  today (6/13) for transfer at 4:00p.m.  This Writer informe

## 2017-06-13 NOTE — PHYSICAL THERAPY NOTE
PHYSICAL THERAPY TREATMENT NOTE - INPATIENT    Room Number: 337/337-A       Presenting Problem: R sided weakness, dysarthria    Problem List  Principal Problem:    Acute CVA (cerebrovascular accident) [facility])      ASSESSMENT     Patient with improveemnt in wheelchair)?: A Little   -   Need to walk in hospital room?: A Little   -   Climbing 3-5 steps with a railing?: A Lot    AM-PAC Score:  Raw Score: 17   PT Approx Degree of Impairment Score: 50.57%   Standardized Score (AM-PAC Scale): 42.13   CMS Modifier (

## 2017-06-13 NOTE — SLP NOTE
SPEECH/LANGUAGE/COGNITIVE EVALUATION - INPATIENT    Admission Date: 6/9/2017  Evaluation Date: 06/11/2017    Reason for Referral: Motor speech   ASSESSMENT & PLAN   ASSESSMENT  Pt seen sitting upright in chair for motor speech evaluation.      Assessment(s) evaluation and plan of treatment and have been advised and agree on the findings and goals.       FOLLOW UP  Treatment Plan: Dysphagia therapy  Number of Visits to Meet Established Goals: 2  Follow Up Needed: Yes  SLP Follow-up Date: 06/13/17    Thank you f

## 2017-06-14 NOTE — DISCHARGE SUMMARY
Sigifredo Naresh    PATIENT'S NAME: Anjanajuan luis Noriega   ATTENDING PHYSICIAN: Savanah Vila MD   PATIENT ACCOUNT#:   [id number]    LOCATION:  [address] [place] [address] #:   [id number]       YOB: 1926  ADMISSION DATE:       06/09/2017

## 2017-06-15 NOTE — SLP NOTE
SPEECH DAILY NOTE - INPATIENT    Evaluation Date: 06/10/2017    ASSESSMENT & PLAN   ASSESSMENT  Pt seen sitting upright in bed for PO trials of recommended diet per BSSE.  Pt with good oral acceptance and bilabial seal without loss of bolus on any consisten trials of nectar and thin liquid trials sitting upright in chair. GOAL PROGRESSING. Goal #4 The patient will complete OMEs targeting Lingual and Labial strength, ROM, and coordination with 100 % accuracy within 5 session(s).     GOAL NOT ADDRESSED 6/11

## 2017-09-01 PROBLEM — B02.9: Status: RESOLVED | Noted: 2017-01-01 | Resolved: 2017-01-01

## 2017-12-30 PROBLEM — R09.02 HYPOXIA: Status: ACTIVE | Noted: 2017-01-01

## 2017-12-30 PROBLEM — I50.9 CONGESTIVE HEART FAILURE, UNSPECIFIED CONGESTIVE HEART FAILURE CHRONICITY, UNSPECIFIED CONGESTIVE HEART FAILURE TYPE: Status: ACTIVE | Noted: 2017-01-01

## 2017-12-30 PROBLEM — J18.9 COMMUNITY ACQUIRED PNEUMONIA, UNSPECIFIED LATERALITY: Status: ACTIVE | Noted: 2017-01-01

## 2017-12-30 NOTE — ED NOTES
Dr Shlomo Ball and this nurse re-assessed pt. 85% on 15LNRM. Daughter at bedside. Pt placed on BiPap10/5 60%.

## 2017-12-30 NOTE — H&P
[facility] Hospitalist H&P       CC: Patient presents with:  Dyspnea SADAF SOB (respiratory)       PCP: Jesse Romano MD      ASSESSMENT / PLAN:     Mr. Va Hampton is a 79 yo M with PMH of CAD s/p CABG, HTN, HL, prostate cancer who lives at [facility] [phone]    [facility]       History of Present Illness:      Mr. Rick Steve is a 81 yo M with PMH of CAD s/p CABG, HTN, HL, prostate cancer who lives at [facility] and presents with shortness of breath, found to be hypoxic.  History obtained from Supple, no JVD  Pulm: decreased breath sounds, +crackles   CV: RRR, no murmurs   ABD: Soft, non-tender, non-distended, +BS  Neuro: Grossly normal, CN intact, sensory intact  Psych: Affect- normal  SKIN: warm, dry  EXT: no edema    Diagnostic Data:    CBC/C

## 2017-12-30 NOTE — CONSULTS
Henry Pulido  6/28/1926    Reason for consult: dyspnea      HPI:   80year old male with h/o of CABG, CAD, Mild LV dysfunction, h/o of CVA 06/2017, transferred from [facility] for worsening hypoxia , desaturation 70%, noted multifocal infiltrates on CXR lb (63.5 kg)    Height: 172.7 cm (5' 8\")        No intake or output data in the 24 hours ending 12/30/17 1747    Wt Readings from Last 6 Encounters:  12/30/17 : 140 lb (63.5 kg)  06/13/17 : 141 lb (64 kg)  05/04/17 : 128 lb (58.1 kg)  04/23/17 : 145 lb (6 CVA     Plan:  1. Treat resp infection   2. Will follow volume status with treatment. Intermittent diuretic ok to maintain lungs on dry side   3.  No clear signs of HF by exam, though will check echo to confirm         Patientn of Dr. Pilo Grant MD

## 2017-12-30 NOTE — ED PROVIDER NOTES
Patient Seen in: [facility] AND [facility] Emergency Department    History   Patient presents with:  Dyspnea SADAF SOB (respiratory)    Stated Complaint: weakness    HPI    29-year-old male history of high blood pressure and reported coronary atherosclerotic disea Neck supple. Cardiovascular: Normal rate, regular rhythm and intact and equal distal pulses. Pulmonary/Chest: Mild tachypnea but no respiratory distress. Harsh crackles at the right posterior midlung fields posteriorly. Abdominal: Soft.  There is no orders. TROPONIN I   PROCALCITONIN   RAINBOW DRAW BLUE   RAINBOW DRAW GOLD   RESPIRATORY PANEL FLU EXPANDED   BLOOD CULTURE   BLOOD CULTURE     EKG    Rate, intervals and axes as noted on EKG Report.   Rate: 79  Rhythm: Sinus Rhythm  Reading: new left bun and procalcitonin. After nonrebreather was titrated off the patient continued to desaturate. He was placed on BiPAP and was more comfortable with saturations 95 and above. Talk to the daughter. At this time he is full code still.   Echo earlier this yea

## 2017-12-30 NOTE — ED INITIAL ASSESSMENT (HPI)
Pt here from [facility]. Here with medics for SOB, weakness. Pulse ox 70% RA. Denies chest pain.  Alert

## 2017-12-31 NOTE — PLAN OF CARE
MD NOTIFICATION    MD Notified: Dr. Ok Oliva    Time: 1640    Reason: troponin 13.27. Patient with left shoulder pain. Vital signs stable, HR Sinus 75, bp 135/77. On 15L high flow NC o2 98%. Patient on heparin gtt.      New Orders: redraw troponin tomorrow m

## 2017-12-31 NOTE — PROGRESS NOTES
[facility] RESPIRATORY THERAPY PROGRESS NOTE    Noninvasive Ventilation:  Pt requires acute noninvasive ventilation: yes (intermittent / prn)  Mode: Bilevel IPAP at 10 cm H2O EPAP at 7 cm H2O. Backup rate: 12  Tolerating well.     High Flow Nasal Cannula in use when

## 2017-12-31 NOTE — CONSULTS
Critical Care H&P/Consult     NAME: Lizzie Ayers - ROOM: [address] [place] [address] - MRN: [id number] - Age: 80year old - :  1926    Date of Admission: 2017  3:57 PM  Admission Diagnosis: Hypoxia [R09.02]  Congestive heart failure, unspecified congestive Past Surgical History:  No date: CABG  No date: LAPAROSCOPY, SURGICAL PROSTATECTOMY, RETROPUBI*  Social History:   Smoking status: Never Smoker    Smokeless tobacco: Never Used    Alcohol use No     Family History:  has no family status information on fi 200 ml   Net               50 ml   FiO2 (%):  [60 %-94 %] 60 %  BP (!) 133/122 (BP Location: Left arm)   Pulse 69   Temp 98 °F (36.7 °C) (Temporal)   Resp 25   Ht 5' 8\" (1.727 m)   Wt 137 lb 2 oz (62.2 kg)   SpO2 93%   BMI 20.85 kg/m²   Physical Exa

## 2017-12-31 NOTE — PLAN OF CARE
Problem: Patient/Family Goals  Goal: Patient/Family Long Term Goal  Patient's Long Term Goal: to be discharged from the hospital    Interventions:  - Cont monitoring. Meds, lab tests as ordered.  VS per protocol  - See additional Care Plan goals for specifi broncho-pulmonary hygiene including cough, deep breathe, Incentive Spirometry  - Assess the need for suctioning and perform as needed  - Assess and instruct to report SOB or any respiratory difficulty  - Respiratory Therapy support as indicated  - Manage/a

## 2017-12-31 NOTE — PROGRESS NOTES
S: feels well, still desaturation with activity, denies any chest pain. Trop rise to 8    O:   Blood pressure 131/69, pulse 69, temperature 98 °F (36.7 °C), temperature source Temporal, resp.  rate (!) 31, height 172.7 cm (5' 8\"), weight 137 lb 2 oz (62.2 50 mcg Oral Before breakfast   • Metoprolol Succinate ER  12.5 mg Oral Daily Beta Blocker           Patient Active Problem List:     CAD (coronary artery disease)     PAD (peripheral artery disease) (HCC)     Herpes zoster ophthalmicus of right eye     Acu

## 2017-12-31 NOTE — PLAN OF CARE
Problem: NEUROLOGICAL - ADULT  Goal: Achieves stable or improved neurological status  INTERVENTIONS  - Assess for and report changes in neurological status  - Initiate measures to prevent increased intracranial pressure  - Maintain blood pressure and fluid arrhythmias  - Monitor electrolytes and administer replacement therapy as ordered   Outcome: Progressing  Monitor shows SR    Problem: RESPIRATORY - ADULT  Goal: Achieves optimal ventilation and oxygenation  INTERVENTIONS:  - Assess for changes in respirat

## 2017-12-31 NOTE — PROGRESS NOTES
[facility] Hospitalist Progress Note     CC: Hospital Follow up    PCP: Chasity Lozada MD       Assessment/Plan:     Principal Problem:    Hypoxia  Active Problems:    Community acquired pneumonia, unspecified laterality    Congestive heart failure, unspe course     FN:  - IVF: none  - Diet: NPO     DVT Prophy: SCD, HSQ  Lines: PIV     Dispo: ICU    Updated daughter Noé Sayruben on 12/31/17     Further recommendations pending patient's clinical course.   [place] Hospitalist to continue to follow patient while in house 12/31/17   0503   RBC  4.77  4.62   HGB  14.6  14.2   HCT  45.1  43.7   MCV  94.7  94.6   MCH  30.6  30.7   MCHC  32.3  32.5   RDW  14.3  14.3   WBC  15.4*  25.5*   PLT  407*  428*         Recent Labs   Lab  12/30/17   1619  12/31/17   0503   GLU  179*  12

## 2017-12-31 NOTE — HISTORICAL OFFICE NOTE
Mary Valentin  : 1926  ACCOUNT:  [id number]  [phone]  PCP: Dr. Jazmín Peng    TODAY'S DATE: 10/21/2015  DICTATED BY:  Lissy Anderson M.D.]    HPI:  [On 10/21/2015, Annamarie Escobar, an 80-year-old male, presented with no interim cardiac compla rhythm, clear to auscultation. GI: no masses, tenderness or hepatosplenomegaly, rectal deferred. MS: adequate gait for exercise/testing. EXT: no clubbing or cyanosis. SKIN: no rashes, lesions, ulcers.   NEURO/PSYCH: alert and oriented to time, place and pe

## 2018-01-01 ENCOUNTER — HOSPITAL ENCOUNTER (INPATIENT)
Facility: HOSPITAL | Age: 83
LOS: 1 days | DRG: 283 | End: 2018-01-01
Attending: HOSPITALIST | Admitting: HOSPITALIST
Payer: OTHER MISCELLANEOUS

## 2018-01-01 ENCOUNTER — APPOINTMENT (OUTPATIENT)
Dept: GENERAL RADIOLOGY | Facility: HOSPITAL | Age: 83
DRG: 280 | End: 2018-01-01
Attending: INTERNAL MEDICINE
Payer: MEDICARE

## 2018-01-01 ENCOUNTER — APPOINTMENT (OUTPATIENT)
Dept: CT IMAGING | Facility: HOSPITAL | Age: 83
DRG: 280 | End: 2018-01-01
Attending: HOSPITALIST
Payer: MEDICARE

## 2018-01-01 ENCOUNTER — APPOINTMENT (OUTPATIENT)
Dept: GENERAL RADIOLOGY | Facility: HOSPITAL | Age: 83
DRG: 280 | End: 2018-01-01
Attending: HOSPITALIST
Payer: MEDICARE

## 2018-01-01 ENCOUNTER — APPOINTMENT (OUTPATIENT)
Dept: ULTRASOUND IMAGING | Facility: HOSPITAL | Age: 83
DRG: 280 | End: 2018-01-01
Attending: INTERNAL MEDICINE
Payer: MEDICARE

## 2018-01-01 VITALS
DIASTOLIC BLOOD PRESSURE: 41 MMHG | SYSTOLIC BLOOD PRESSURE: 70 MMHG | RESPIRATION RATE: 23 BRPM | HEART RATE: 106 BPM | TEMPERATURE: 102 F

## 2018-01-01 VITALS
DIASTOLIC BLOOD PRESSURE: 60 MMHG | SYSTOLIC BLOOD PRESSURE: 97 MMHG | BODY MASS INDEX: 18.74 KG/M2 | WEIGHT: 123.63 LBS | HEIGHT: 68 IN | HEART RATE: 104 BPM | OXYGEN SATURATION: 89 % | RESPIRATION RATE: 22 BRPM | TEMPERATURE: 98 F

## 2018-01-01 LAB
ALBUMIN SERPL BCP-MCNC: 2.1 G/DL (ref 3.5–4.8)
ALBUMIN/GLOB SERPL: 0.5 {RATIO} (ref 1–2)
ALBUMIN/GLOB SERPL: 0.5 {RATIO} (ref 1–2)
ALP SERPL-CCNC: 131 U/L (ref 32–100)
ALP SERPL-CCNC: 133 U/L (ref 32–100)
ALP SERPL-CCNC: 150 U/L (ref 32–100)
ALT SERPL-CCNC: 21 U/L (ref 17–63)
ALT SERPL-CCNC: 25 U/L (ref 17–63)
ALT SERPL-CCNC: 30 U/L (ref 17–63)
ANION GAP SERPL CALC-SCNC: 10 MMOL/L (ref 0–18)
ANION GAP SERPL CALC-SCNC: 10 MMOL/L (ref 0–18)
ANION GAP SERPL CALC-SCNC: 11 MMOL/L (ref 0–18)
ANION GAP SERPL CALC-SCNC: 12 MMOL/L (ref 0–18)
ANION GAP SERPL CALC-SCNC: 7 MMOL/L (ref 0–18)
ANION GAP SERPL CALC-SCNC: 8 MMOL/L (ref 0–18)
APTT PPP: 34.7 SECONDS (ref 23.2–35.3)
APTT PPP: 42.5 SECONDS (ref 23.2–35.3)
APTT PPP: 46.8 SECONDS (ref 23.2–35.3)
APTT PPP: 47.3 SECONDS (ref 23.2–35.3)
APTT PPP: 54.6 SECONDS (ref 23.2–35.3)
APTT PPP: 58.3 SECONDS (ref 23.2–35.3)
AST SERPL-CCNC: 28 U/L (ref 15–41)
AST SERPL-CCNC: 35 U/L (ref 15–41)
AST SERPL-CCNC: 46 U/L (ref 15–41)
BASOPHILS # BLD: 0 K/UL (ref 0–0.2)
BASOPHILS NFR BLD: 0 %
BILIRUB DIRECT SERPL-MCNC: 0.3 MG/DL (ref 0–0.2)
BILIRUB SERPL-MCNC: 0.8 MG/DL (ref 0.3–1.2)
BILIRUB SERPL-MCNC: 1.1 MG/DL (ref 0.3–1.2)
BILIRUB SERPL-MCNC: 1.1 MG/DL (ref 0.3–1.2)
BNP SERPL-MCNC: 398 PG/ML (ref 0–100)
BUN SERPL-MCNC: 25 MG/DL (ref 8–20)
BUN SERPL-MCNC: 27 MG/DL (ref 8–20)
BUN SERPL-MCNC: 28 MG/DL (ref 8–20)
BUN SERPL-MCNC: 34 MG/DL (ref 8–20)
BUN SERPL-MCNC: 41 MG/DL (ref 8–20)
BUN SERPL-MCNC: 44 MG/DL (ref 8–20)
BUN/CREAT SERPL: 31.8 (ref 10–20)
BUN/CREAT SERPL: 37.3 (ref 10–20)
BUN/CREAT SERPL: 38.7 (ref 10–20)
BUN/CREAT SERPL: 38.9 (ref 10–20)
BUN/CREAT SERPL: 41.1 (ref 10–20)
BUN/CREAT SERPL: 44.2 (ref 10–20)
CALCIUM SERPL-MCNC: 8.9 MG/DL (ref 8.5–10.5)
CALCIUM SERPL-MCNC: 9 MG/DL (ref 8.5–10.5)
CALCIUM SERPL-MCNC: 9.3 MG/DL (ref 8.5–10.5)
CHLORIDE SERPL-SCNC: 102 MMOL/L (ref 95–110)
CHLORIDE SERPL-SCNC: 102 MMOL/L (ref 95–110)
CHLORIDE SERPL-SCNC: 106 MMOL/L (ref 95–110)
CHLORIDE SERPL-SCNC: 107 MMOL/L (ref 95–110)
CHLORIDE SERPL-SCNC: 114 MMOL/L (ref 95–110)
CHLORIDE SERPL-SCNC: 99 MMOL/L (ref 95–110)
CO2 SERPL-SCNC: 27 MMOL/L (ref 22–32)
CO2 SERPL-SCNC: 30 MMOL/L (ref 22–32)
CO2 SERPL-SCNC: 30 MMOL/L (ref 22–32)
CO2 SERPL-SCNC: 31 MMOL/L (ref 22–32)
CO2 SERPL-SCNC: 31 MMOL/L (ref 22–32)
CO2 SERPL-SCNC: 34 MMOL/L (ref 22–32)
CREAT SERPL-MCNC: 0.67 MG/DL (ref 0.5–1.5)
CREAT SERPL-MCNC: 0.72 MG/DL (ref 0.5–1.5)
CREAT SERPL-MCNC: 0.77 MG/DL (ref 0.5–1.5)
CREAT SERPL-MCNC: 0.85 MG/DL (ref 0.5–1.5)
CREAT SERPL-MCNC: 1.06 MG/DL (ref 0.5–1.5)
CREAT SERPL-MCNC: 1.07 MG/DL (ref 0.5–1.5)
EOSINOPHIL # BLD: 0 K/UL (ref 0–0.7)
EOSINOPHIL # BLD: 0 K/UL (ref 0–0.7)
EOSINOPHIL # BLD: 0.2 K/UL (ref 0–0.7)
EOSINOPHIL # BLD: 0.2 K/UL (ref 0–0.7)
EOSINOPHIL NFR BLD: 0 %
EOSINOPHIL NFR BLD: 0 %
EOSINOPHIL NFR BLD: 1 %
EOSINOPHIL NFR BLD: 1 %
ERYTHROCYTE [DISTWIDTH] IN BLOOD BY AUTOMATED COUNT: 14.2 % (ref 11–15)
ERYTHROCYTE [DISTWIDTH] IN BLOOD BY AUTOMATED COUNT: 14.2 % (ref 11–15)
ERYTHROCYTE [DISTWIDTH] IN BLOOD BY AUTOMATED COUNT: 14.3 % (ref 11–15)
ERYTHROCYTE [DISTWIDTH] IN BLOOD BY AUTOMATED COUNT: 14.4 % (ref 11–15)
ERYTHROCYTE [DISTWIDTH] IN BLOOD BY AUTOMATED COUNT: 14.4 % (ref 11–15)
ERYTHROCYTE [DISTWIDTH] IN BLOOD BY AUTOMATED COUNT: 14.6 % (ref 11–15)
ERYTHROCYTE [DISTWIDTH] IN BLOOD BY AUTOMATED COUNT: 15 % (ref 11–15)
GLOBULIN PLAS-MCNC: 4.1 G/DL (ref 2.5–3.7)
GLOBULIN PLAS-MCNC: 4.4 G/DL (ref 2.5–3.7)
GLUCOSE SERPL-MCNC: 123 MG/DL (ref 70–99)
GLUCOSE SERPL-MCNC: 131 MG/DL (ref 70–99)
GLUCOSE SERPL-MCNC: 133 MG/DL (ref 70–99)
GLUCOSE SERPL-MCNC: 134 MG/DL (ref 70–99)
GLUCOSE SERPL-MCNC: 143 MG/DL (ref 70–99)
GLUCOSE SERPL-MCNC: 143 MG/DL (ref 70–99)
HCT VFR BLD AUTO: 38.6 % (ref 41–52)
HCT VFR BLD AUTO: 40.6 % (ref 41–52)
HCT VFR BLD AUTO: 40.7 % (ref 41–52)
HCT VFR BLD AUTO: 41.7 % (ref 41–52)
HCT VFR BLD AUTO: 42.5 % (ref 41–52)
HCT VFR BLD AUTO: 44.7 % (ref 41–52)
HCT VFR BLD AUTO: 47.6 % (ref 41–52)
HGB BLD-MCNC: 12.7 G/DL (ref 13.5–17.5)
HGB BLD-MCNC: 13.2 G/DL (ref 13.5–17.5)
HGB BLD-MCNC: 13.5 G/DL (ref 13.5–17.5)
HGB BLD-MCNC: 13.5 G/DL (ref 13.5–17.5)
HGB BLD-MCNC: 13.9 G/DL (ref 13.5–17.5)
HGB BLD-MCNC: 14.4 G/DL (ref 13.5–17.5)
HGB BLD-MCNC: 14.9 G/DL (ref 13.5–17.5)
INR BLD: 1.4 (ref 0.9–1.2)
LYMPHOCYTES # BLD: 0.4 K/UL (ref 1–4)
LYMPHOCYTES # BLD: 1.1 K/UL (ref 1–4)
LYMPHOCYTES # BLD: 1.2 K/UL (ref 1–4)
LYMPHOCYTES # BLD: 1.7 K/UL (ref 1–4)
LYMPHOCYTES NFR BLD: 2 %
LYMPHOCYTES NFR BLD: 5 %
LYMPHOCYTES NFR BLD: 6 %
LYMPHOCYTES NFR BLD: 8 %
MAGNESIUM SERPL-MCNC: 2.2 MG/DL (ref 1.8–2.5)
MAGNESIUM SERPL-MCNC: 2.2 MG/DL (ref 1.8–2.5)
MAGNESIUM SERPL-MCNC: 2.5 MG/DL (ref 1.8–2.5)
MAGNESIUM SERPL-MCNC: 2.6 MG/DL (ref 1.8–2.5)
MCH RBC QN AUTO: 30.2 PG (ref 27–32)
MCH RBC QN AUTO: 30.3 PG (ref 27–32)
MCH RBC QN AUTO: 30.5 PG (ref 27–32)
MCH RBC QN AUTO: 30.6 PG (ref 27–32)
MCH RBC QN AUTO: 30.9 PG (ref 27–32)
MCH RBC QN AUTO: 31.1 PG (ref 27–32)
MCH RBC QN AUTO: 31.4 PG (ref 27–32)
MCHC RBC AUTO-ENTMCNC: 31.2 G/DL (ref 32–37)
MCHC RBC AUTO-ENTMCNC: 32.1 G/DL (ref 32–37)
MCHC RBC AUTO-ENTMCNC: 32.3 G/DL (ref 32–37)
MCHC RBC AUTO-ENTMCNC: 32.4 G/DL (ref 32–37)
MCHC RBC AUTO-ENTMCNC: 32.6 G/DL (ref 32–37)
MCHC RBC AUTO-ENTMCNC: 32.9 G/DL (ref 32–37)
MCHC RBC AUTO-ENTMCNC: 33.3 G/DL (ref 32–37)
MCV RBC AUTO: 93.5 FL (ref 80–100)
MCV RBC AUTO: 93.7 FL (ref 80–100)
MCV RBC AUTO: 94.2 FL (ref 80–100)
MCV RBC AUTO: 94.4 FL (ref 80–100)
MCV RBC AUTO: 94.8 FL (ref 80–100)
MCV RBC AUTO: 95.6 FL (ref 80–100)
MCV RBC AUTO: 97 FL (ref 80–100)
METAMYELOCYTES # BLD MANUAL: 0.22 K/UL
METAMYELOCYTES # BLD MANUAL: 0.22 K/UL
METAMYELOCYTES # BLD MANUAL: 0.41 K/UL
MONOCYTES # BLD: 0.9 K/UL (ref 0–1)
MONOCYTES # BLD: 1.5 K/UL (ref 0–1)
MONOCYTES # BLD: 1.6 K/UL (ref 0–1)
MONOCYTES # BLD: 2 K/UL (ref 0–1)
MONOCYTES NFR BLD: 10 %
MONOCYTES NFR BLD: 4 %
MONOCYTES NFR BLD: 7 %
MONOCYTES NFR BLD: 8 %
MYELOCYTES NFR BLD: 1 %
MYELOCYTES NFR BLD: 1 %
MYELOCYTES NFR BLD: 2 %
NEUTROPHILS # BLD AUTO: 16.5 K/UL (ref 1.8–7.7)
NEUTROPHILS # BLD AUTO: 18.1 K/UL (ref 1.8–7.7)
NEUTROPHILS # BLD AUTO: 18.2 K/UL (ref 1.8–7.7)
NEUTROPHILS # BLD AUTO: 19.6 K/UL (ref 1.8–7.7)
NEUTROPHILS NFR BLD: 80 %
NEUTROPHILS NFR BLD: 80 %
NEUTROPHILS NFR BLD: 83 %
NEUTROPHILS NFR BLD: 86 %
NEUTS BAND NFR BLD: 1 %
NEUTS BAND NFR BLD: 1 %
NEUTS BAND NFR BLD: 10 %
NEUTS BAND NFR BLD: 3 %
NRBC BLD-RTO: 1 % (ref ?–1)
OSMOLALITY UR CALC.SUM OF ELEC: 292 MOSM/KG (ref 275–295)
OSMOLALITY UR CALC.SUM OF ELEC: 302 MOSM/KG (ref 275–295)
OSMOLALITY UR CALC.SUM OF ELEC: 303 MOSM/KG (ref 275–295)
OSMOLALITY UR CALC.SUM OF ELEC: 305 MOSM/KG (ref 275–295)
OSMOLALITY UR CALC.SUM OF ELEC: 320 MOSM/KG (ref 275–295)
OSMOLALITY UR CALC.SUM OF ELEC: 335 MOSM/KG (ref 275–295)
PLATELET # BLD AUTO: 353 K/UL (ref 140–400)
PLATELET # BLD AUTO: 367 K/UL (ref 140–400)
PLATELET # BLD AUTO: 381 K/UL (ref 140–400)
PLATELET # BLD AUTO: 400 K/UL (ref 140–400)
PLATELET # BLD AUTO: 418 K/UL (ref 140–400)
PLATELET # BLD AUTO: 419 K/UL (ref 140–400)
PLATELET # BLD AUTO: 452 K/UL (ref 140–400)
PMV BLD AUTO: 10.3 FL (ref 7.4–10.3)
PMV BLD AUTO: 9 FL (ref 7.4–10.3)
PMV BLD AUTO: 9.2 FL (ref 7.4–10.3)
PMV BLD AUTO: 9.2 FL (ref 7.4–10.3)
PMV BLD AUTO: 9.3 FL (ref 7.4–10.3)
PMV BLD AUTO: 9.3 FL (ref 7.4–10.3)
PMV BLD AUTO: 9.4 FL (ref 7.4–10.3)
POTASSIUM SERPL-SCNC: 3.9 MMOL/L (ref 3.3–5.1)
POTASSIUM SERPL-SCNC: 4 MMOL/L (ref 3.3–5.1)
POTASSIUM SERPL-SCNC: 4.1 MMOL/L (ref 3.3–5.1)
POTASSIUM SERPL-SCNC: 4.1 MMOL/L (ref 3.3–5.1)
PROCALCITONIN SERPL-MCNC: 0.47 NG/ML (ref ?–0.11)
PROCALCITONIN SERPL-MCNC: 1.58 NG/ML (ref ?–0.11)
PROT SERPL-MCNC: 6.2 G/DL (ref 5.9–8.4)
PROT SERPL-MCNC: 6.5 G/DL (ref 5.9–8.4)
PROT SERPL-MCNC: 6.6 G/DL (ref 5.9–8.4)
PROTHROMBIN TIME: 17.1 SECONDS (ref 11.8–14.5)
RBC # BLD AUTO: 4.09 M/UL (ref 4.5–5.9)
RBC # BLD AUTO: 4.31 M/UL (ref 4.5–5.9)
RBC # BLD AUTO: 4.35 M/UL (ref 4.5–5.9)
RBC # BLD AUTO: 4.36 M/UL (ref 4.5–5.9)
RBC # BLD AUTO: 4.53 M/UL (ref 4.5–5.9)
RBC # BLD AUTO: 4.71 M/UL (ref 4.5–5.9)
RBC # BLD AUTO: 4.91 M/UL (ref 4.5–5.9)
SODIUM SERPL-SCNC: 138 MMOL/L (ref 136–144)
SODIUM SERPL-SCNC: 142 MMOL/L (ref 136–144)
SODIUM SERPL-SCNC: 142 MMOL/L (ref 136–144)
SODIUM SERPL-SCNC: 144 MMOL/L (ref 136–144)
SODIUM SERPL-SCNC: 149 MMOL/L (ref 136–144)
SODIUM SERPL-SCNC: 156 MMOL/L (ref 136–144)
TROPONIN I SERPL-MCNC: 9.61 NG/ML (ref ?–0.03)
WBC # BLD AUTO: 19.1 K/UL (ref 4–11)
WBC # BLD AUTO: 20.4 K/UL (ref 4–11)
WBC # BLD AUTO: 20.5 K/UL (ref 4–11)
WBC # BLD AUTO: 21.6 K/UL (ref 4–11)
WBC # BLD AUTO: 21.8 K/UL (ref 4–11)
WBC # BLD AUTO: 23.3 K/UL (ref 4–11)
WBC # BLD AUTO: 23.3 K/UL (ref 4–11)

## 2018-01-01 PROCEDURE — 85025 COMPLETE CBC W/AUTO DIFF WBC: CPT | Performed by: HOSPITALIST

## 2018-01-01 PROCEDURE — 71045 X-RAY EXAM CHEST 1 VIEW: CPT | Performed by: HOSPITALIST

## 2018-01-01 PROCEDURE — 74018 RADEX ABDOMEN 1 VIEW: CPT | Performed by: HOSPITALIST

## 2018-01-01 PROCEDURE — 85027 COMPLETE CBC AUTOMATED: CPT | Performed by: HOSPITALIST

## 2018-01-01 PROCEDURE — 94660 CPAP INITIATION&MGMT: CPT

## 2018-01-01 PROCEDURE — 73030 X-RAY EXAM OF SHOULDER: CPT | Performed by: INTERNAL MEDICINE

## 2018-01-01 PROCEDURE — 94668 MNPJ CHEST WALL SBSQ: CPT

## 2018-01-01 PROCEDURE — 84484 ASSAY OF TROPONIN QUANT: CPT | Performed by: INTERNAL MEDICINE

## 2018-01-01 PROCEDURE — 94640 AIRWAY INHALATION TREATMENT: CPT

## 2018-01-01 PROCEDURE — 83735 ASSAY OF MAGNESIUM: CPT | Performed by: HOSPITALIST

## 2018-01-01 PROCEDURE — 80048 BASIC METABOLIC PNL TOTAL CA: CPT | Performed by: HOSPITALIST

## 2018-01-01 PROCEDURE — 80076 HEPATIC FUNCTION PANEL: CPT | Performed by: HOSPITALIST

## 2018-01-01 PROCEDURE — 85730 THROMBOPLASTIN TIME PARTIAL: CPT | Performed by: HOSPITALIST

## 2018-01-01 PROCEDURE — 84145 PROCALCITONIN (PCT): CPT | Performed by: INTERNAL MEDICINE

## 2018-01-01 PROCEDURE — 85007 BL SMEAR W/DIFF WBC COUNT: CPT | Performed by: HOSPITALIST

## 2018-01-01 PROCEDURE — 80053 COMPREHEN METABOLIC PANEL: CPT | Performed by: HOSPITALIST

## 2018-01-01 PROCEDURE — 85730 THROMBOPLASTIN TIME PARTIAL: CPT | Performed by: INTERNAL MEDICINE

## 2018-01-01 PROCEDURE — 71045 X-RAY EXAM CHEST 1 VIEW: CPT | Performed by: INTERNAL MEDICINE

## 2018-01-01 PROCEDURE — 74019 RADEX ABDOMEN 2 VIEWS: CPT | Performed by: HOSPITALIST

## 2018-01-01 PROCEDURE — 94667 MNPJ CHEST WALL 1ST: CPT

## 2018-01-01 PROCEDURE — 83880 ASSAY OF NATRIURETIC PEPTIDE: CPT | Performed by: HOSPITALIST

## 2018-01-01 PROCEDURE — 71250 CT THORAX DX C-: CPT | Performed by: HOSPITALIST

## 2018-01-01 PROCEDURE — 85610 PROTHROMBIN TIME: CPT | Performed by: HOSPITALIST

## 2018-01-01 RX ORDER — FUROSEMIDE 10 MG/ML
40 INJECTION INTRAMUSCULAR; INTRAVENOUS EVERY 8 HOURS PRN
Status: DISCONTINUED | OUTPATIENT
Start: 2018-01-01 | End: 2018-01-01

## 2018-01-01 RX ORDER — ATROPINE SULFATE 10 MG/ML
2 SOLUTION/ DROPS OPHTHALMIC EVERY 2 HOUR PRN
Status: CANCELLED | OUTPATIENT
Start: 2018-01-01

## 2018-01-01 RX ORDER — METOCLOPRAMIDE HYDROCHLORIDE 5 MG/ML
5 INJECTION INTRAMUSCULAR; INTRAVENOUS EVERY 6 HOURS PRN
Status: DISCONTINUED | OUTPATIENT
Start: 2018-01-01 | End: 2018-01-01

## 2018-01-01 RX ORDER — GLYCOPYRROLATE 0.2 MG/ML
0.2 INJECTION, SOLUTION INTRAMUSCULAR; INTRAVENOUS
Status: DISCONTINUED | OUTPATIENT
Start: 2018-01-01 | End: 2018-01-01

## 2018-01-01 RX ORDER — METOCLOPRAMIDE HYDROCHLORIDE 5 MG/ML
10 INJECTION INTRAMUSCULAR; INTRAVENOUS EVERY 6 HOURS PRN
Status: DISCONTINUED | OUTPATIENT
Start: 2018-01-01 | End: 2018-01-01

## 2018-01-01 RX ORDER — LORAZEPAM 2 MG/ML
1 INJECTION INTRAMUSCULAR EVERY 4 HOURS PRN
Status: DISCONTINUED | OUTPATIENT
Start: 2018-01-01 | End: 2018-01-01

## 2018-01-01 RX ORDER — METOCLOPRAMIDE 10 MG/1
10 TABLET ORAL EVERY 6 HOURS PRN
Status: DISCONTINUED | OUTPATIENT
Start: 2018-01-01 | End: 2018-01-01

## 2018-01-01 RX ORDER — SODIUM CHLORIDE 0.9 % (FLUSH) 0.9 %
10 SYRINGE (ML) INJECTION AS NEEDED
Status: CANCELLED | OUTPATIENT
Start: 2018-01-01

## 2018-01-01 RX ORDER — LORAZEPAM 2 MG/ML
0.5 INJECTION INTRAMUSCULAR EVERY 4 HOURS PRN
Status: CANCELLED | OUTPATIENT
Start: 2018-01-01

## 2018-01-01 RX ORDER — LORAZEPAM 2 MG/ML
1 INJECTION INTRAMUSCULAR EVERY 4 HOURS PRN
Status: CANCELLED | OUTPATIENT
Start: 2018-01-01

## 2018-01-01 RX ORDER — ONDANSETRON 2 MG/ML
4 INJECTION INTRAMUSCULAR; INTRAVENOUS EVERY 6 HOURS PRN
Status: DISCONTINUED | OUTPATIENT
Start: 2018-01-01 | End: 2018-01-01

## 2018-01-01 RX ORDER — SCOLOPAMINE TRANSDERMAL SYSTEM 1 MG/1
1 PATCH, EXTENDED RELEASE TRANSDERMAL
Status: CANCELLED | OUTPATIENT
Start: 2018-01-10

## 2018-01-01 RX ORDER — ACETAMINOPHEN 650 MG/1
650 SUPPOSITORY RECTAL EVERY 6 HOURS SCHEDULED
Status: DISCONTINUED | OUTPATIENT
Start: 2018-01-01 | End: 2018-01-01

## 2018-01-01 RX ORDER — HEPARIN SODIUM 5000 [USP'U]/ML
5000 INJECTION, SOLUTION INTRAVENOUS; SUBCUTANEOUS EVERY 8 HOURS SCHEDULED
Status: DISCONTINUED | OUTPATIENT
Start: 2018-01-01 | End: 2018-01-01

## 2018-01-01 RX ORDER — ATROPINE SULFATE 10 MG/ML
2 SOLUTION/ DROPS OPHTHALMIC EVERY 2 HOUR PRN
Status: DISCONTINUED | OUTPATIENT
Start: 2018-01-01 | End: 2018-01-01

## 2018-01-01 RX ORDER — FUROSEMIDE 10 MG/ML
40 INJECTION INTRAMUSCULAR; INTRAVENOUS
Status: COMPLETED | OUTPATIENT
Start: 2018-01-01 | End: 2018-01-01

## 2018-01-01 RX ORDER — SODIUM CHLORIDE 0.9 % (FLUSH) 0.9 %
10 SYRINGE (ML) INJECTION AS NEEDED
Status: DISCONTINUED | OUTPATIENT
Start: 2018-01-01 | End: 2018-01-01

## 2018-01-01 RX ORDER — CLOPIDOGREL BISULFATE 75 MG/1
75 TABLET ORAL DAILY
Status: DISCONTINUED | OUTPATIENT
Start: 2018-01-01 | End: 2018-01-01

## 2018-01-01 RX ORDER — 0.9 % SODIUM CHLORIDE 0.9 %
VIAL (ML) INJECTION
Status: COMPLETED
Start: 2018-01-01 | End: 2018-01-01

## 2018-01-01 RX ORDER — IPRATROPIUM BROMIDE AND ALBUTEROL SULFATE 2.5; .5 MG/3ML; MG/3ML
3 SOLUTION RESPIRATORY (INHALATION)
Status: DISCONTINUED | OUTPATIENT
Start: 2018-01-01 | End: 2018-01-01

## 2018-01-01 RX ORDER — PROCHLORPERAZINE MALEATE 10 MG
10 TABLET ORAL EVERY 4 HOURS PRN
Status: DISCONTINUED | OUTPATIENT
Start: 2018-01-01 | End: 2018-01-01

## 2018-01-01 RX ORDER — FUROSEMIDE 40 MG/1
40 TABLET ORAL EVERY 8 HOURS PRN
Status: DISCONTINUED | OUTPATIENT
Start: 2018-01-01 | End: 2018-01-01

## 2018-01-01 RX ORDER — LORAZEPAM 2 MG/ML
2 INJECTION INTRAMUSCULAR EVERY 4 HOURS PRN
Status: CANCELLED | OUTPATIENT
Start: 2018-01-01

## 2018-01-01 RX ORDER — ACETAMINOPHEN 10 MG/ML
1000 INJECTION, SOLUTION INTRAVENOUS EVERY 8 HOURS PRN
Status: DISCONTINUED | OUTPATIENT
Start: 2018-01-01 | End: 2018-01-01

## 2018-01-01 RX ORDER — ONDANSETRON 4 MG/1
4 TABLET, ORALLY DISINTEGRATING ORAL EVERY 6 HOURS PRN
Status: DISCONTINUED | OUTPATIENT
Start: 2018-01-01 | End: 2018-01-01

## 2018-01-01 RX ORDER — LORAZEPAM 2 MG/ML
0.5 INJECTION INTRAMUSCULAR EVERY 4 HOURS PRN
Status: DISCONTINUED | OUTPATIENT
Start: 2018-01-01 | End: 2018-01-01

## 2018-01-01 RX ORDER — ONDANSETRON 2 MG/ML
4 INJECTION INTRAMUSCULAR; INTRAVENOUS EVERY 4 HOURS PRN
Status: DISCONTINUED | OUTPATIENT
Start: 2018-01-01 | End: 2018-01-01

## 2018-01-01 RX ORDER — ALFUZOSIN HYDROCHLORIDE 10 MG/1
10 TABLET, EXTENDED RELEASE ORAL
Status: DISCONTINUED | OUTPATIENT
Start: 2018-01-01 | End: 2018-01-01

## 2018-01-01 RX ORDER — FUROSEMIDE 40 MG/1
40 TABLET ORAL EVERY 8 HOURS PRN
Status: CANCELLED | OUTPATIENT
Start: 2018-01-01

## 2018-01-01 RX ORDER — FUROSEMIDE 10 MG/ML
20 INJECTION INTRAMUSCULAR; INTRAVENOUS ONCE
Status: COMPLETED | OUTPATIENT
Start: 2018-01-01 | End: 2018-01-01

## 2018-01-01 RX ORDER — SCOLOPAMINE TRANSDERMAL SYSTEM 1 MG/1
1 PATCH, EXTENDED RELEASE TRANSDERMAL
Status: DISCONTINUED | OUTPATIENT
Start: 2018-01-01 | End: 2018-01-01

## 2018-01-01 RX ORDER — PROCHLORPERAZINE 25 MG
25 SUPPOSITORY, RECTAL RECTAL EVERY 6 HOURS PRN
Status: DISCONTINUED | OUTPATIENT
Start: 2018-01-01 | End: 2018-01-01

## 2018-01-01 RX ORDER — ACETAMINOPHEN 160 MG/5ML
650 SOLUTION ORAL EVERY 6 HOURS SCHEDULED
Status: DISCONTINUED | OUTPATIENT
Start: 2018-01-01 | End: 2018-01-01

## 2018-01-01 RX ORDER — METOCLOPRAMIDE 10 MG/1
5 TABLET ORAL EVERY 6 HOURS PRN
Status: DISCONTINUED | OUTPATIENT
Start: 2018-01-01 | End: 2018-01-01

## 2018-01-01 RX ORDER — ACETAMINOPHEN 325 MG/1
650 TABLET ORAL EVERY 4 HOURS PRN
Status: DISCONTINUED | OUTPATIENT
Start: 2018-01-01 | End: 2018-01-01

## 2018-01-01 RX ORDER — GLYCOPYRROLATE 0.2 MG/ML
0.2 INJECTION, SOLUTION INTRAMUSCULAR; INTRAVENOUS
Status: CANCELLED | OUTPATIENT
Start: 2018-01-01

## 2018-01-01 RX ORDER — LORAZEPAM 2 MG/ML
2 INJECTION INTRAMUSCULAR EVERY 4 HOURS PRN
Status: DISCONTINUED | OUTPATIENT
Start: 2018-01-01 | End: 2018-01-01

## 2018-01-01 RX ORDER — FUROSEMIDE 10 MG/ML
40 INJECTION INTRAMUSCULAR; INTRAVENOUS EVERY 8 HOURS PRN
Status: CANCELLED | OUTPATIENT
Start: 2018-01-01

## 2018-01-01 RX ORDER — DEXTROSE AND SODIUM CHLORIDE 5; .45 G/100ML; G/100ML
INJECTION, SOLUTION INTRAVENOUS CONTINUOUS
Status: DISCONTINUED | OUTPATIENT
Start: 2018-01-01 | End: 2018-01-01

## 2018-01-01 RX ORDER — ONDANSETRON 2 MG/ML
4 INJECTION INTRAMUSCULAR; INTRAVENOUS EVERY 4 HOURS PRN
Status: CANCELLED | OUTPATIENT
Start: 2018-01-01

## 2018-01-01 RX ORDER — SCOLOPAMINE TRANSDERMAL SYSTEM 1 MG/1
1 PATCH, EXTENDED RELEASE TRANSDERMAL
Status: DISCONTINUED | OUTPATIENT
Start: 2018-01-10 | End: 2018-01-01

## 2018-01-01 RX ORDER — ATORVASTATIN CALCIUM 20 MG/1
20 TABLET, FILM COATED ORAL NIGHTLY
Status: DISCONTINUED | OUTPATIENT
Start: 2018-01-01 | End: 2018-01-01

## 2018-01-01 RX ORDER — ONDANSETRON 4 MG/1
4 TABLET, ORALLY DISINTEGRATING ORAL EVERY 6 HOURS PRN
Status: CANCELLED | OUTPATIENT
Start: 2018-01-01

## 2018-01-01 NOTE — PROGRESS NOTES
[facility]    Cardiology Progress Note  [facility]    Ronak Bustamante Patient Status:  Inpatient    1926 MRN [id number]   Location [facility] 2W/SW Attending Adan Shannon MD   Hosp Day # 2 PCP Landrum Merlin CREATSERUM 0.85 01/01/2018   BUN 27 (H) 01/01/2018    01/01/2018   K 4.1 01/01/2018   CL 99 01/01/2018   CO2 27 01/01/2018    (H) 01/01/2018   CA 9.3 01/01/2018   ALB 3.3 (L) 12/19/2017   ALKPHO 84 12/19/2017   BILT 0.66 12/19/2017   TP 7.8 15:56 by Truman García MD    Ekg 12-lead    Result Date: 12/30/2017  ECG Report  Interpretation  -------------------------- Sinus rhythm -Left bundle branch block and left axis.  ABNORMAL When compared with ECG of 12/30/2017 16:06:48 no change Electronica

## 2018-01-01 NOTE — PROGRESS NOTES
[facility] Hospitalist Progress Note     CC: Hospital Follow up    PCP: Vonna Apgar, MD       Assessment/Plan:     Principal Problem:    Hypoxia  Active Problems:    Community acquired pneumonia, unspecified laterality    Congestive heart failure, unspe confirmed with patient and family     FN:  - IVF: none  - Diet: cardiac     DVT Prophy: SCD, HSQ  Lines: PIV     Dispo: PCU    Updated daughter Rachna Lies on 12/31/17     Further recommendations pending patient's clinical course.   [facility] hospitalist to continue to 14.3  13.5   HCT  43.7  44.7  40.6*   MCV  94.6  95.0  94.2   MCH  30.7  30.4  31.4   MCHC  32.5  32.0  33.3   RDW  14.3  14.2  14.2   WBC  25.5*  23.7*  23.3*   PLT  428*  430*  452*         Recent Labs   Lab  12/31/17   0503  12/31/17   0906  01/01/18

## 2018-01-01 NOTE — PLAN OF CARE
NEUROLOGICAL - ADULT    • Achieves maximal functionality and self care Not Progressing        PAIN - ADULT    • Verbalizes/displays adequate comfort level or patient's stated pain goal Not Progressing        Tylenol for shoulder pain with fair result.   CAR

## 2018-01-01 NOTE — PLAN OF CARE
CARDIOVASCULAR - ADULT    • Maintains optimal cardiac output and hemodynamic stability Progressing    • Absence of cardiac arrhythmias or at baseline Progressing    Remains in NSR 1st AVB, BP WNL.     NEUROLOGICAL - ADULT    • Achieves stable or improved ne

## 2018-01-01 NOTE — PROGRESS NOTES
Pulmonary Progress Note      NAME: Lizzie Ayers - ROOM: 554/182-P - MRN: [id number] - Age: 80year old - : 1926    Assessment/Plan:  1. Acute respiratory failure - from PNA in the setting of heart failure. PCT elevated, BNP mildly up.  Ade Layton rhythm, normal S1S2, No murmur. Abdomen: soft, non-tender, non-distended, positive BS. Extremity: no edema.  Left anterior shoulder tender to palpation    Recent Labs   Lab  01/01/18   0524   RBC  4.31*   HGB  13.5   HCT  40.6*   MCV  94.2   MCH  31.4

## 2018-01-02 NOTE — PROGRESS NOTES
Pulmonary Progress Note      NAME: Deidra Landis - ROOM: 686/453-Z - MRN: [id number] - Age: 80year old - : 1926    Assessment/Plan:  1. Acute respiratory failure - from PNA in the setting of heart failure. PCT elevated, BNP up. Trop elevation. Exam:   General: alert, cooperative, no respiratory distress. HEENT: Normocephalic atraumatic. Lips, mucosa, and tongue normal.  No thrush noted. Lungs: bilateral crackles   Chest wall: No tenderness or deformity.    Heart: Regular rate and rhythm, luz

## 2018-01-02 NOTE — CM/SW NOTE
SW spoke with the pt for initial assessment. However, he appears unreliable, so the information was clarified by the dtr [facility] [phone]. The pt states that his son Jomar Bennett is the [facility], but there was no contact on file for him.  Dtr [facility] states she will

## 2018-01-02 NOTE — PROGRESS NOTES
[facility] Dosing Service  Antibiotic Dosing    Ivan Anderson is a 80year old male for whom pharmacy is dosing merrem for treatment of  Pneumonia.      Allergies: is allergic to amoclan; amoxicillin-na benzoate; erythromycin base; opioid analgesics; t

## 2018-01-02 NOTE — PROGRESS NOTES
[facility] Hospitalist Progress Note     CC: Hospital Follow up    PCP: Donavon Boxer, MD       Assessment/Plan:     Principal Problem:    Hypoxia  Active Problems:    Community acquired pneumonia, unspecified laterality    Congestive heart failure, unspe month, continued synthroid 50 mcg     Prostate cancer  - not on treatment     Dementia/mild cognitive impairment  - reported per daughter, patient sometimes has problems articulating symptoms, forgetful    CODE- DNR, confirmed with patient and family     F dry  EXT: no edema    Data Review:       Labs:     Recent Labs   Lab  12/31/17   0906  01/01/18   0524  01/02/18   0435   RBC  4.71  4.31*  4.36*   HGB  14.3  13.5  13.5   HCT  44.7  40.6*  41.7   MCV  95.0  94.2  95.6   MCH  30.4  31.4  30.9   MCHC  32.0 12/30/2017  CONCLUSION: Low lung volumes. Right lower lobe airspace opacity with a small effusion. Airspace opacity throughout the left lung, with more focal consolidation in the lower lobe and a small effusion.  Findings may be due to pulmonary edema/conge

## 2018-01-03 NOTE — PROGRESS NOTES
[facility] Hospitalist Progress Note     CC: Hospital Follow up    PCP: Tommy Kelly MD       Assessment/Plan:     Principal Problem:    Hypoxia  Active Problems:    Community acquired pneumonia, unspecified laterality    Congestive heart failure, unspe controlled currently     HL  - statin     Hypothyroidism  - noted to have elevated TSH 9/2017  - has been on synthroid, recently had dose increased early this month, continued synthroid 50 mcg     Prostate cancer  - not on treatment     Dementia/mild cogni non-distended, +BS  Neuro: Grossly normal, CN intact, sensory intact  Psych: Affect- normal  SKIN: warm, dry  EXT: no edema    Data Review:       Labs:     Recent Labs   Lab  01/01/18   0524  01/02/18   0435  01/03/18   0528   RBC  4.31*  4.36*  4.35*   HG Before breakfast   • Metoprolol Succinate ER  12.5 mg Oral Daily Beta Blocker       acetaminophen, Normal Saline Flush, ondansetron HCl

## 2018-01-03 NOTE — PROGRESS NOTES
[facility]    Cardiology Progress Note  [facility]    Ronak Bustamante Patient Status:  Inpatient    1926 MRN [id number]   Location [facility] 2W/SW Attending Isabel Melton MD   Hosp Day # 4 PCP [place] Stephenson 01/03/2018    01/03/2018   K 4.0 01/03/2018    01/03/2018   CO2 30 01/03/2018    (H) 01/03/2018   CA 9.3 01/03/2018   ALB 3.3 (L) 12/19/2017   ALKPHO 84 12/19/2017   BILT 0.66 12/19/2017   TP 7.8 12/19/2017   AST 32 12/19/2017   ALT 24 1

## 2018-01-03 NOTE — PROGRESS NOTES
Pulmonary Progress Note     Assessment / Plan:  1. Acute respiratory failure - from PNA in the setting of heart failure. PCT elevated, BNP up. Trop elevation  - on HFNC 15L this morning  - IS and acapella  - abx as below  2. Pneumonia - community acquired.

## 2018-01-04 NOTE — PROGRESS NOTES
[facility] Hospitalist Progress Note     CC: Hospital Follow up    PCP: Alana Angela MD       Assessment/Plan:     Principal Problem:    Hypoxia  Active Problems:    Community acquired pneumonia, unspecified laterality    Congestive heart failure, unspe elevated in ED  - controlled currently     HL  - statin     Hypothyroidism  - noted to have elevated TSH 9/2017  - has been on synthroid, recently had dose increased early this month, continued synthroid 50 mcg     Prostate cancer  - not on treatment     D sided, +crackles   CV: RRR, no murmurs   ABD: Soft, non-tender, non-distended, +BS  Neuro: Grossly normal, CN intact, sensory intact  Psych: Affect- normal  SKIN: warm, dry  EXT: no edema    Data Review:       Labs:     Recent Labs   Lab  01/02/18   2678

## 2018-01-04 NOTE — PROGRESS NOTES
[facility]    Cardiology Progress Note  [facility]    Ronak Bustamante Patient Status:  Inpatient    1926 MRN [id number]   Location [facility] 2W/SW Attending Fadia Willams MD   [facility] Day # 5 PCP Misty Gutierrez, 0.72 01/04/2018   BUN 28 (H) 01/04/2018    01/04/2018   K 4.0 01/04/2018    01/04/2018   CO2 30 01/04/2018    (H) 01/04/2018   CA 9.0 01/04/2018   ALB 2.1 (L) 01/04/2018   ALKPHO 133 (H) 01/04/2018   BILT 0.8 01/04/2018   TP 6.2 01/04/20

## 2018-01-04 NOTE — PROGRESS NOTES
Pulmonary Progress Note      NAME: Bernice Aguirre - ROOM: 004/215-E - MRN: [id number] - Age: 80year old - : 1926    Assessment/Plan:  1. Acute respiratory failure - from PNA in the setting of heart failure. PCT elevated, BNP up.  Trop elevation rate and rhythm, normal S1S2, II/IV murmur. Abdomen: soft, non-tender, non-distended, positive BS.    Extremity: trace edema    Recent Labs   Lab  01/04/18   0440   RBC  4.09*   HGB  12.7*   HCT  38.6*   MCV  94.4   MCH  31.1   MCHC  32.9   RDW  14.4   WB

## 2018-01-05 NOTE — PROGRESS NOTES
Pulmonary Progress Note      NAME: Ama Goncalves - ROOM: 86518-A - MRN: [id number] - Age: 80year old - : 1926    Assessment/Plan:  1. Acute respiratory failure - from PNA in the setting of heart failure. PCT elevated, BNP up. Trop elevation. Normocephalic atraumatic. Lips, mucosa, and tongue normal.  No thrush noted. Lungs: diminished at the bases   Chest wall: No tenderness or deformity. Heart: Regular rate and rhythm, normal S1S2, no murmur.    Abdomen: soft, non-tender, non-distended, pos

## 2018-01-05 NOTE — PROGRESS NOTES
[facility]    Cardiology Progress Note  [facility]    Ronak Bustamante Patient Status:  Inpatient    1926 MRN [id number]   Location [facility] 2W/SW Attending Laron Murphy MD   [facility] Day # 6 PCP Justo Gifford, had offered him a cardiac cath in the future but certainly not appropriate at this time.       Results:     Lab Results  Component Value Date   WBC 20.5 (H) 01/05/2018   HGB 13.9 01/05/2018   HCT 42.5 01/05/2018    01/05/2018   CREATSERUM 0.67 01/05/

## 2018-01-05 NOTE — RESPIRATORY THERAPY NOTE
Patient switched to max venturi high flow NC with 55LPM flow/ FIO2 100%. Patient tolerating the device better than the bipap. Patient respiratory rates improved from the high 30's to low 20's.

## 2018-01-05 NOTE — DIETARY NOTE
ADULT NUTRITION INITIAL ASSESSMENT    Pt is at moderate nutrition risk. Pt does not meet malnutrition criteria. RECOMMENDATIONS TO MD:   Potential keofeed/nutrition support vs diet advance 1/6 pending BSS results and pt/family wishes.       Kayla Fabian unspecified congestive heart failure chronicity, unspecified congestive heart failure type ([facility] [address] [I50.9]  Community acquired pneumonia, unspecified laterality [J18.9]      PERTINENT PAST MEDICAL HISTORY:   Past Medical History:   Diagnosis Date   • CANCER K 4.0 4.0 4.1    102 106   CO2 30 30 31   OSMOCALC 303* 302* 305*         NUTRITION RELATED PHYSICAL FINDINGS:  - Body Fat/Muscle Mass: mild depletion body fat and mild depletion muscle mass per visual exam.    - Fluid Accumulation: none per visual

## 2018-01-05 NOTE — SLP NOTE
SLP orders received and acknowledged. At this time, family and patient decline BSSE. SLP to f/u as pt able and willing to participate. Recommend NPO until further assessment by SLP. Thank you.      Therapist:  John Ling

## 2018-01-05 NOTE — PROGRESS NOTES
[facility] Hospitalist Progress Note     CC: Hospital Follow up    PCP: Marlon Nieto MD       Assessment/Plan:     Principal Problem:    Hypoxia  Active Problems:    Community acquired pneumonia, unspecified laterality    Congestive heart failure, unspe possibly rotator cuff injury  - tylenol for pain    HTN  - BP mildly elevated in ED  - controlled currently     HL  - statin     Hypothyroidism  - noted to have elevated TSH 9/2017  - has been on synthroid, recently had dose increased early this month, con (69.4 kg)      Exam   GEN: elderly male on HFNC, A&O   HEENT: EOMI, PERRLA  Neck: Supple, no JVD  Pulm: decreased breath sounds L sided, +crackles   CV: RRR, no murmurs   ABD: Soft, non-tender, non-distended, +BS  Neuro: Grossly normal, CN intact, sensory

## 2018-01-06 NOTE — PROGRESS NOTES
Nelly Castro Patient Status:  Inpatient    1926 MRN [id number]   Location [facility] 2W/SW Attending Shraddha Fry MD   Hosp Day # 7 PCP Rey Sanon MD     Critical Care Progress Note      Assessment/Plan:  1.  Acute respiratory hour   Intake              100 ml   Output             1650 ml   Net            -1550 ml       /78 (BP Location: Left arm)   Pulse 78   Temp 99.1 °F (37.3 °C) (Temporal)   Resp 25   Ht 5' 8\" (1.727 m)   Wt 125 lb 3.2 oz (56.8 kg)   SpO2 94%   BMI 19

## 2018-01-06 NOTE — PLAN OF CARE
RESPIRATORY - ADULT    • Achieves optimal ventilation and oxygenation Not Progressing       Patients demand for oxygen increased early in the morning. Patient went from 100% NRB mask (sats low in 80's and desats quickly when pt removes the mask).  Now on va

## 2018-01-06 NOTE — PLAN OF CARE
CARDIOVASCULAR - ADULT    • Maintains optimal cardiac output and hemodynamic stability Not Progressing    • Absence of cardiac arrhythmias or at baseline Not Progressing        NEUROLOGICAL - ADULT    • Achieves stable or improved neurological status Not P

## 2018-01-06 NOTE — RESPIRATORY THERAPY NOTE
Patient /family refused vest  therapy. [facility] has educated the patient on the purpose of and need for this therapy as well as potential negative outcomes associated with deferring treatment.  Despite education, patient maintains refusal.

## 2018-01-06 NOTE — PROGRESS NOTES
[facility]    Cardiology Progress Note  [facility]    Ronak Bustamante Patient Status:  Inpatient    1926 MRN [id number]   Location [facility] 2W/SW Attending Zachary Anderson MD   [address] Day # 7 PCP Abhishek Guajardo, angiogram at some point if respiratory status improves.   Will continue medical treatment, start plavix and titrate atorvastatin to 20 mg.    LAILA BAPTISTE, 01/06/18, 6:28 AM  [facility]      Results:       Lab Results  Component Value Date   WBC 21.6

## 2018-01-06 NOTE — PROGRESS NOTES
[facility] dosing service    Initial Pharmacokinetic Consult for Vancomycin Dosing     Raissa Zimmerman is a 80year old male admitted on 12/30/17 who is being treated for pneumonia. Pharmacy has been asked to dose Vancomycin by Dr. Gina Will.     He is 01/05/18 1034   Order Status: Completed Updated: 01/05/18 1035   Narrative:     PROCEDURE: XR CHEST AP PORTABLE (CPT=71010)  TIME:   : 39. COMPARISON: [facility], CT CHEST (CPT=71250), 1/01/2018, 17:11.   [facility], XR closely as increasing. 4.  Pharmacy will follow and monitor renal function changes, toxicity and efficacy. Pharmacy will continue to follow him. We appreciate the opportunity to assist in his care.     Tacho Nuñez, PharmD  1/6/2018  9:37 AM  [facility]

## 2018-01-06 NOTE — SLP NOTE
SLP orders rec'd. Chart reviewed, contacted RN for BSSE. However, RN stated that patient unable to participate in BSSE at this time due to being NPO for possible procedure today. Will follow for BSSE once patient able to tolerate PO intake.   Thank you,  Kay Tate

## 2018-01-06 NOTE — PROGRESS NOTES
[facility] Hospitalist Progress Note     CC: Hospital Follow up    PCP: Jesse Romano MD       Assessment/Plan:     Principal Problem:    Hypoxia  Active Problems:    Community acquired pneumonia, unspecified laterality    Congestive heart failure, unspe cath but currently not stable from respiratory standpoint, timing per cards    HTN  - BP mildly elevated in ED  - controlled currently     HL  - statin     Hypothyroidism  - noted to have elevated TSH 9/2017  - has been on synthroid, recently had dose incr kg)  12/30/17 1627 : 140 lb (63.5 kg)  12/19/17 0933 : 145 lb 12.8 oz (66.1 kg)  11/09/17 1459 : 153 lb (69.4 kg)      Exam   GEN: elderly male on HFNC, A&O   HEENT: EOMI, PERRLA  Neck: Supple, no JVD  Pulm: decreased breath sounds L sided, +crackles   CV: Followup studies advised. Probable small right pleural effusion. Xr Abdomen Minimum 2 Views (cpt=74019)    Result Date: 1/6/2018  CONCLUSION:  1. Nonobstructive bowel gas pattern. 2. No distinct radiographic evidence of free air.   3. Partially vis

## 2018-01-07 NOTE — HOSPICE RN NOTE
[facility] met with family. Pt is taking his last breaths very shallow and periods of apnea . Family is aware and did not want to sign papers they want to sit with pt.

## 2018-01-07 NOTE — PROGRESS NOTES
Willem Low Patient Status:  Inpatient    1926 MRN [id number]   Location [facility] 2W/SW Attending Elmyra Nageotte, MD   Hosp Day # 8 PCP Travis Aguirre MD     Critical Care Progress Note      Assessment/Plan:  1.  Acute respiratory 2182  Last data filed at 01/07/18 0600   Gross per 24 hour   Intake              745 ml   Output              720 ml   Net               25 ml       /74 (BP Location: Left arm)   Pulse 81   Temp 98.5 °F (36.9 °C) (Temporal)   Resp 20   Ht 5' 8\" (1.7

## 2018-01-07 NOTE — SLP NOTE
SLP orders received and acknowledged. SLP to f/u as pt able and willing to participate. At this time, RN stated she would be completing bedside nursing swallow screen and would call SLP if speech/swallow eval was necessary. SLP will await RN's call.

## 2018-01-07 NOTE — PROGRESS NOTES
[facility]    Cardiology Progress Note  [facility]    Ronak Bustamante Patient Status:  Inpatient    1926 MRN [id number]   Location [facility] 2W/SW Attending Fadia Willams MD   [facility] Day # 8 PCP Misty Gutierrez, and wait for plan by palliative care.     [address] BAPTISTE, 01/06/18, 6:28 AM  [facility]      Results:       Lab Results  Component Value Date   WBC 21.8 (H) 01/07/2018   HGB 14.9 01/07/2018   HCT 47.6 01/07/2018    (H) 01/07/2018   CREATSERUM 1.0

## 2018-01-07 NOTE — PROGRESS NOTES
[facility] Pharmacy Note:  Renal Dose Adjustment for Metoclopramide (REGLAN)    Sarah Springer has been prescribed Metoclopramide (REGLAN) 10 mg every 6 hours as needed for n/v.    Estimated Creatinine Clearance: 35.7 mL/min (based on SCr of 1.07 mg/dL).     His

## 2018-01-07 NOTE — SLP NOTE
2nd attempt made @1200; however, pt now on bipap. SLP to re-attempt in PM if time/schedule permits or else 1/8 as appropriate.

## 2018-01-07 NOTE — CM/SW NOTE
SW received MDO for hospice. SW placed referral to [facility].     Melva Angela, [address]

## 2018-01-07 NOTE — PLAN OF CARE
Problem: Patient/Family Goals  Goal: Patient/Family Long Term Goal  Patient's Long Term Goal: to be discharged from the hospital    Interventions:  - Cont monitoring. Meds, lab tests as ordered.  VS per protocol  - See additional Care Plan goals for specifi using appropriate pain scale  - Administer analgesics based on type and severity of pain and evaluate response  - Implement non-pharmacological measures as appropriate and evaluate response  - Consider cultural and social influences on pain and pain manage

## 2018-01-08 PROBLEM — J96.00 ACUTE RESPIRATORY FAILURE (HCC): Status: ACTIVE | Noted: 2018-01-01

## 2018-01-08 NOTE — PROGRESS NOTES
01/07/18 2022   Clinical Encounter Type   Visited With Family   Routine Visit Follow-up   Continue Visiting Yes   Referral From Nurse   Referral To    Family Spiritual Encounters   Family Participation in Care 5   Family Support During Treatment

## 2018-01-08 NOTE — PROGRESS NOTES
Patient sleeping, comfortable. On morphine drip. To go into hospice today. Will sign off. Please call with any questions.

## 2018-01-08 NOTE — HOSPICE RN NOTE
Met with daughter and she wants comfort for her dad. She is in agreement with Hospice and wants the Morphine drip to help with his dyspnea. Dr Nancy Kwon in agreement and Dr Flory Cotto in agreement with the POC. POC was discussed with Simin BEE. Patient very

## 2018-01-08 NOTE — SLP NOTE
Attempted to see pt for swallowing evaluation. Collaborated with RN, Sadaf Barclay. RN reports the pt has decided to go with hospice care. Speech therapy will sign off case. Roxy CARLOS  [address] [place] [address] MS/CCC-SLP  Speech Language Pathologist  Denis Dan

## 2018-01-08 NOTE — PLAN OF CARE
NEUROLOGICAL - ADULT    • Achieves stable or improved neurological status Not Progressing        Patient/Family Goals    • Patient/Family Long Term Goal Not Progressing    • Patient/Family Short Term Goal Not Progressing        RESPIRATORY - ADULT    • Ach

## 2018-01-08 NOTE — PROGRESS NOTES
[facility] Hospitalist Progress Note     CC: Hospital Follow up    PCP: Nanette Jean Baptiste MD       Assessment/Plan:     Principal Problem:    Hypoxia  Active Problems:    Community acquired pneumonia, unspecified laterality    Congestive heart failure, unspe 1/5/18, likely CT findings consistent with PNA poss ards?  - repeat CXR stable today, small right sided effusion noted on 1/6/18 poss better today  - discussed with pulm and family today at bedside and extensively on 1/6  - patient placed on BIPAP on 1/6 t hospitalist to continue to follow patient while in house     Patient and/or patient's family given opportunity to ask questions and note understanding and agreeing with therapeutic plan as outlined     Karyle Motes, MD  [facility] Hospitalist  Answering Deyanira Hewitt 149*  156*   K  4.0  3.9   CL  107  114*   CO2  31  34*       Recent Labs   Lab  01/06/18   0536  01/07/18   0526   ALT  25  21   AST  35  28   ALB  2.1*  2.1*         Imaging:  Xr Abdomen (1 View) (cpt=74018)    Result Date: 1/6/2018  CONCLUSION:  1.  Nons

## 2018-01-09 NOTE — DISCHARGE SUMMARY
General Medicine Discharge Summary     Patient ID:  Gabriela Khan  80year old  6/28/1926    Admit date: 12/30/2017    Discharge date and time: 01/08/18    Attending Physician: Taty Nichols MD    Primary Care Physician: Mayelin Lindo MD vancomycin  - repeat lasix given per cardiology on 1/6, TTE EF 45-50%, hypokinesis of mid-apical inferior myocardium  - resp viral panel negative, LE doppler negative  - s/p lasix 12/31, 1/1; repeat  1/5/18, likely CT findings consistent with PNA poss ards Hospitalist

## 2018-01-09 NOTE — H&P
[facility] Hospitalist Progress Note     CC: Hospital Follow up    PCP: Jazmine Rubio MD       Assessment/Plan:     Active Problems:    Acute respiratory failure [facility])      Mr. Jorden Hernadez is a 79 yo M with PMH of CAD s/p CABG, dementia, HTN, HL, prostate c 20 ml   Net              -20 ml       Last 3 Weights  01/07/18 0452 : 123 lb 9.6 oz (56.1 kg)  01/06/18 0500 : 125 lb 3.2 oz (56.8 kg)  01/05/18 0600 : 131 lb 3.2 oz (59.5 kg)  01/04/18 0600 : 131 lb 4.8 oz (59.6 kg)  01/03/18 0636 : 134 lb 14.7 oz (61.2 k Views (kwu=59007)    Result Date: 1/6/2018  CONCLUSION:  1. Nonobstructive bowel gas pattern. 2. No distinct radiographic evidence of free air.   3. Partially visualized post thoracotomy chest with extensive bibasilar airspace disease, concerning for pneum

## 2018-01-09 NOTE — PROGRESS NOTES
Pt  at [address]. Resusitation not attempted as pt was DNR.     Time of Death 1    Family Notified Yes  Name and Relation: Wife Ajith Jefferson) and daughter Faheem Vera)     MD: Dr. Giovana Light of [facility] Notified Yes Brenda Montana; Reference# [id number])

## 2018-01-09 NOTE — PLAN OF CARE
Patient/Family Goals    • Patient/Family Long Term Goal Progressing    • Patient/Family Short Term Goal Progressing        Patient appears comfortable through night. Family at bedside through night. Patient remains on morphine drip at 3mg/hour.

## 2018-01-09 NOTE — PROGRESS NOTES
[facility] Hospitalist Progress Note     CC: Hospital Follow up    PCP: Daniel Snider MD       Assessment/Plan:     Active Problems:    Acute respiratory failure [facility])    Mr. Mars Jones is a 81 yo M with PMH of CAD s/p CABG, dementia, HTN, HL, prostate can -25 ml       Last 3 Weights  01/07/18 0452 : 123 lb 9.6 oz (56.1 kg)  01/06/18 0500 : 125 lb 3.2 oz (56.8 kg)  01/05/18 0600 : 131 lb 3.2 oz (59.5 kg)  01/04/18 0600 : 131 lb 4.8 oz (59.6 kg)  01/03/18 0636 : 134 lb 14.7 oz (61.2 kg)  12/31/17 0522 : 1

## 2018-01-09 NOTE — DISCHARGE SUMMARY
General Medicine Discharge Summary     Patient ID:  Jack Wright  80year old  6/28/1926    Admit date: 1/8/2018    Discharge date and time: 01/09/18    Attending Physician: Melissa Cowan MD     Primary Care Physician: Zoltan Pulido MD

## 2022-10-05 NOTE — PROGRESS NOTES
[facility] Hospitalist Progress Note     CC: Hospital Follow up    PCP: Ellie Ramsey MD       Assessment/Plan:     Principal Problem:    Hypoxia  Active Problems:    Community acquired pneumonia, unspecified laterality    Congestive heart failure, unspe Patient is scheduled for a colonoscopy with Dr Christopher on 10-19-22. Please send Suprep prep to patient's selected pharmacy.    Please place COVID order if not already placed and test is required    Thank you, GI Preadmit Surgery Scheduler  1/5/18, likely CT findings consistent with PNA poss ards?  - repeat CXR stable today, small right sided effusion noted on 1/6/18 poss better today  - discussed with pulm and family today at bedside and extensively on 1/6  - patient placed on BIPAP on 1/6 t hospitalist to continue to follow patient while in house     Patient and/or patient's family given opportunity to ask questions and note understanding and agreeing with therapeutic plan as outlined     Boston Kerr MD  [facility] Hospitalist  Answering Service numbnoy 01/05/18   0428  01/06/18   0536  01/07/18   0526   GLU  143*  134*  133*   BUN  25*  41*  44*   CREATSERUM  0.67  1.06  1.07   GFRAA  >60  >60  >60   GFRNAA  >60  >60  >60   CA  8.9  9.3  9.3   NA  144  149*  156*   K  4.1  4.0  3.9   CL  106  107  114* aspirin  81 mg Oral Daily   • Levothyroxine Sodium  50 mcg Oral Before breakfast   • Metoprolol Succinate ER  12.5 mg Oral Daily Beta Blocker     • Dextrose-NaCl 42 mL/hr at 01/07/18 0919     acetaminophen, Normal Saline Flush, ondansetron HCl

## (undated) NOTE — ED AVS SNAPSHOT
[facility] Emergency Department    [place] [address]    Phone:  [phone]    Fax:  [phone]           Henry Pulido   MRN: [id number]    Department:  [facility] Emergency [facility]   Date of Visit:  4/2 Insurance plans vary and the physician(s) referred by the ER may not be covered by your plan. Please contact your insurance company to determine coverage and benefits available for follow-up care and referrals.       If you have difficulty scheduling your prescription right away and begin taking the medication(s) as directed.   If you believe that any of the medications or instructions on this list is different from what your Primary Care doctor has instructed you - please continue to take your medications a Patient [address] to help you get signed up for insurance coverage. Patient [address] is a Federal Navigator program that can help with your Affordable Care Act coverage, as well as all types of Medicaid plans.   To get signed up and covere

## (undated) NOTE — ED AVS SNAPSHOT
[facility] Emergency Department    [place] [address]    Phone:  [phone]    Fax:  [phone]           Lizzie Armen   MRN: [id number]    Department:  [facility] Emergency Department   Date of Visit:  5/4 service to you, we would appreciate any positive or negative feedback related to the care you received in our emergency department. Please call our [address] at [phone]. Your Emergency Department team is here to serve you.   You are our top priori I certified that I have received a copy of the aftercare instructions; that these instructions have been explained to me; all questions pertaining to these instructions have been answered in a satisfactory manner.         [id number]

## (undated) NOTE — ED AVS SNAPSHOT
[facility] Emergency Department    [place] [address]    Phone:  [phone]    Fax:  [phone]           Jany Mcintosh   MRN: [id number]    Department:  [facility] Emergency Department   Date of Visit:  4/2 and Class Registration line at [phone] or find a doctor online by visiting [email or web address].    IF THERE IS ANY CHANGE OR WORSENING OF YOUR CONDITION, CALL YOUR PRIMARY CARE PHYSICIAN AT ONCE OR RETURN IMMEDIATELY TO [address] [place] [address].     If

## (undated) NOTE — IP AVS SNAPSHOT
[address] [place] [address] [phone]                Discharge Summary   6/9/2017    Ronak Bustamante           Admission Information        Provider Department    6/9/2017 Zoltan Pulido MD Em Morning Afternoon Evening As Needed    gabapentin 300 MG Caps   Last time this was given:  100 mg on 6/11/2017  8:37 PM   Commonly known as:  NEURONTIN   What changed:  Another medication with the same name was removed.  Continue taking this medication, an MALATHI BEDOLLA AT [address] [place] [address], [phone], [phone] [address]     Phone:  [phone]    - acetaminophen 325 MG Tabs  - Amiodarone HCl 400 MG Tabs      Please  your prescriptions at the location directed 3 (06/10/17)  0  (06/10/17)  5.0 (06/10/17)  1.4 (06/10/17)  1.3 (H) (06/10/17)  0.2 (06/10/17)  0.0    (06/09/17)  63 (06/09/17)  19 (06/09/17)  15 (06/09/17)  3 (06/09/17)  1  (06/09/17)  5.5 (06/09/17)  1.7 (06/09/17)  1.3 (H) (06/09/17)  0.3 (06/09/17) _____________________________________________________________________________    Medication Side Effects - Medications to be taken at home  As your caregivers, we want you to be aware of the medications you are prescribed to take and their potential SIDE E Headache, Nausea/vomiting           All Other Medications     Polyvinyl Alcohol (LUBRICANT DROPS OP)    triamcinolone acetonide 0.1 % External Cream    Neomycin-Polymyxin-HC 1 % Otic Solution

## (undated) NOTE — LETTER
[facility], [place]  Authorization for Invasive Procedures  1.  I hereby authorize Deepak Vences  , my physician and whomever may be designated as the doctor's assistant, to perform the following operation and/or procedure: performed for the purposes of advancing medicine, science, and/or education, provided my identity is not revealed. If the procedure has been videotaped, the physician/surgeon will obtain the original videotape.  The hospital will not be responsible for stor My signature below affirms that prior to the time of the procedure, I have explained to the patient and/or his legal representative, the risks and benefits involved in the proposed treatment and any reasonable alternative to the proposed treatment.  I have

## (undated) NOTE — ED AVS SNAPSHOT
[facility] Emergency Department    [address]     [address] [place] [address]    Phone:  [phone]    Fax:  [phone]           José Miguel Pérez   MRN: [id number]    Department:  [facility] Emergency Department   Date of Visit:  5/4 and Class Registration line at [phone] or find a doctor online by visiting [email or web address].    IF THERE IS ANY CHANGE OR WORSENING OF YOUR CONDITION, CALL YOUR PRIMARY CARE PHYSICIAN AT ONCE OR RETURN IMMEDIATELY TO [address] [place] [address].     If

## (undated) NOTE — IP AVS SNAPSHOT
Patient Demographics     Address Phone    [phone]  [facility] [address] [phone] [facility]  [phone] [facility]      Emergency Contact(s)     Name Relation Home Work Mobile    Lady Matos Daughter [phone]      [facility]   Last time this was given:  80 mg on 6/13/2017  9:56 AM   Commonly known as:  LIPITOR   Next dose due:   Tomorrow morning 6/14/17        TAKE ONE TABLET DAILY (STOP TAKING LOVASTATIN)    Matt saez [id number] acetaminophen (TYLENOL) tab 650 mg 06/13/17 0049 Given      [id number] amiodarone HCl (PACERONE) tab 400 mg 06/12/17 1608 Given      [id number] amiodarone HCl (PACERONE) tab 400 mg 06/12/17 2039 Given      [id number] amiodarone HCl (PACERONE) tab 4 but soon thereafter developed difficulty with speaking and left facial droop. He was sent to the emergency room, and stat CT head scan showed no acute abnormality and no hemorrhage.   He was seen immediately by Dr. Yessica Jessica from Neurology, and the patient adequate comprehension. Judgment, intellect, orientation all appear intact. DIAGNOSTIC IMPRESSION:    1. Acute stroke. 2.   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 3.   Left facial weakness. 4.   Chronic cardiomyopathy. 5.   Paroxysmal atrial fibrillation. •  atorvastatin (LIPITOR) tab 80 mg, 80 mg, Oral, Daily  •  acetaminophen (TYLENOL) 650 MG rectal suppository 650 mg, 650 mg, Rectal, Q4H PRN  •  Labetalol HCl (TRANDATE) injection 10 mg, 10 mg, Intravenous, Once  •  niCARdipine HCl in NaCl (CARDENE) 20 mg Depends day/night. Has shower chair.    FUNCTIONAL ABILITY STATUS  Gait Assessment    Gait Assistance: Minimum assistance  Distance (ft): 20  Assistive Device: None  Pattern: Comment (narrow ROBERT, LOB to his R, short stride length)  Stoop/Curb Assistance: No MSK:  PROM of BUE full; MMT 5/5 bilateral EF/EE/WE/DF/PF; no dislocation noted BUE  Neuro: CN 2-12 grossly intact, + dysarthria word finding difficulty     Data Review:      Lab Results  Component Value Date   WBC 10.2 06/12/2017   HGB 14.4 06/12/2017   HCT Version 1 of 1    Author:  Tram Carreon MD Service:  (none) Author Type:  Physician    Filed:  6/13/2017 11:06 AM Note Time:  6/13/2017 11:06 AM Status:  Signed    :  Tram Carreon MD (Physician)           Dictated      Electronically sign BALANCE                                                                                                                     Static Sitting: Good  Dynamic Sitting: Good           Static Standing: Fair  Dynamic [address] Goal #3    Current Status  Pt amb 120 ft with RW with CGA. 6/13   Goal #4      Goal #4    Current Status      Goal #5  Patient to demonstrate independence with home activity/exercise instructions provided to patient in preparation for discharge.    Goal #5 Dynamic Sitting: Fair +           Static Standing: Fair  Dynamic Standing: Not tested    ACTIVITY TOLERANCE  Room air  No shortness of breath    AM-PAC '6-Clicks' INPATIENT SHORT FORM - BASIC MOBILITY  How much difficulty does the patient currently have. Nirav Braswell Goal #3 Patient is able to ambulate 200' with cane with SBA without LOB with stable vitals.     Goal #3   Current Status Pt amb 60ft with HHA with Min A for balance, unsteady    Goal #4    Goal #4   Current Status    Goal #5 Patient to demonstrate independe activity, impaired independence with ADLs. These are below pre-admission levels. Patient will benefit from continued IP PT services to address these deficits in preparation for discharge. May benefit from rehab upon discharge.  HH PT/OT another option COGNITION  · A and O x 2, mildly confused at times. RANGE OF MOTION AND STRENGTH ASSESSMENT  Upper extremity ROM and strength are within functional limits for LUE. On the right note mildly impaired motor control in forearm, wrist and hand. CMS Modifier (G-Code): CK    FUNCTIONAL ABILITY STATUS  Gait Assessment   Gait Assistance: Minimum assistance  Distance (ft): 20  Assistive Device: None  Pattern: Comment (narrow ROBERT, LOB to his R, short stride length)  Stoop/Curb Assistance: Not tested OCCUPATIONAL THERAPY ASSESSMENT     Patient is a 80year old male admitted 6/9/2017 for RUE/RLE weakness, slurred speech.   In this OT evaluation patient presents with the following impairments: slightly impaired motor control RUE, increased processing time OT Treatment Plan: Energy conservation/work simplification techniques;Balance activities; ADL training;Functional transfer training;Patient/Family education         OCCUPATIONAL THERAPY MEDICAL/SOCIAL HISTORY     Problem List  Principal Problem:    Acute CV multistep commands with increased time and follows multistep commands with repetition  Awareness of Deficits:  decreased awareness of deficits    VISION  Current Vision: wears glasses only for reading    PERCEPTION  Overall Perception Status:   WFL - withi Bedroom Mobility: Pt with one episode loss of balance when coming out of bathroom and turning corner. Min assist from therapist to correct balance. RN also walked in room while patient was rounding corner and pt looked up at nurse.   Pt with difficulty ma :  Kev Gill, SLP (SPEECH-LANGUAGE PATHOLOGIST)             ADULT VIDEOFLUOROSCOPIC SWALLOWING STUDY    Admission Date: 6/9/2017  Evaluation Date: 06/12/2017  Radiologist: Rossi FLORES   Diet Recommendations - Solids: Mechanical soft chop Tracheal Aspiration:  (None)  Strategy(ies) Implemented (Thin Liquids): No straws  Effectiveness: Yes    NECTAR THICK LIQUIDS  Method of Presentation: Teaspoon  Triggered at: Valleculae  Premature Spillage to: Valleculae  Delay (seconds):  (1-2)  Residue S implementation of aspiration precautions and swallow strategies independently over 3 session(s). Goal #3 The patient will complete OMEs targeting Lingual, Labial and Buccal strength, ROM, and coordination with 100 % accuracy within 3 session(s).    Goal #